# Patient Record
Sex: FEMALE | Race: WHITE | Employment: OTHER | URBAN - METROPOLITAN AREA
[De-identification: names, ages, dates, MRNs, and addresses within clinical notes are randomized per-mention and may not be internally consistent; named-entity substitution may affect disease eponyms.]

---

## 2017-01-03 RX ORDER — OMEPRAZOLE 20 MG/1
CAPSULE, DELAYED RELEASE ORAL
Qty: 90 CAPSULE | Refills: 1 | Status: SHIPPED | OUTPATIENT
Start: 2017-01-03 | End: 2017-07-04

## 2017-03-28 RX ORDER — AMLODIPINE BESYLATE AND BENAZEPRIL HYDROCHLORIDE 5; 10 MG/1; MG/1
CAPSULE ORAL
Qty: 90 CAPSULE | Refills: 0 | Status: SHIPPED | OUTPATIENT
Start: 2017-03-28 | End: 2017-06-20

## 2017-04-28 RX ORDER — SOLIFENACIN SUCCINATE 5 MG/1
TABLET, FILM COATED ORAL
Qty: 90 TABLET | Refills: 0 | Status: SHIPPED | OUTPATIENT
Start: 2017-04-28 | End: 2017-07-31

## 2017-06-20 RX ORDER — AMLODIPINE BESYLATE AND BENAZEPRIL HYDROCHLORIDE 5; 10 MG/1; MG/1
1 CAPSULE ORAL
Qty: 90 CAPSULE | Refills: 0 | Status: SHIPPED | OUTPATIENT
Start: 2017-06-20 | End: 2017-09-16

## 2017-07-06 RX ORDER — OMEPRAZOLE 20 MG/1
CAPSULE, DELAYED RELEASE ORAL
Qty: 90 CAPSULE | Refills: 0 | Status: SHIPPED | OUTPATIENT
Start: 2017-07-06 | End: 2017-10-12

## 2017-07-31 RX ORDER — SOLIFENACIN SUCCINATE 5 MG/1
TABLET, FILM COATED ORAL
Qty: 90 TABLET | Refills: 0 | Status: SHIPPED | OUTPATIENT
Start: 2017-07-31 | End: 2017-10-29

## 2017-09-18 RX ORDER — AMLODIPINE BESYLATE AND BENAZEPRIL HYDROCHLORIDE 5; 10 MG/1; MG/1
CAPSULE ORAL
Qty: 90 CAPSULE | Refills: 0 | Status: SHIPPED | OUTPATIENT
Start: 2017-09-18 | End: 2018-01-04

## 2017-10-13 RX ORDER — OMEPRAZOLE 20 MG/1
CAPSULE, DELAYED RELEASE ORAL
Qty: 90 CAPSULE | Refills: 0 | Status: SHIPPED | OUTPATIENT
Start: 2017-10-13 | End: 2018-01-04

## 2017-10-30 RX ORDER — SOLIFENACIN SUCCINATE 5 MG/1
TABLET, FILM COATED ORAL
Qty: 90 TABLET | Refills: 0 | Status: SHIPPED | OUTPATIENT
Start: 2017-10-30 | End: 2017-12-05

## 2017-11-02 ENCOUNTER — OFFICE VISIT (OUTPATIENT)
Dept: FAMILY MEDICINE CLINIC | Facility: CLINIC | Age: 72
End: 2017-11-02

## 2017-11-02 VITALS
OXYGEN SATURATION: 98 % | TEMPERATURE: 98 F | RESPIRATION RATE: 18 BRPM | BODY MASS INDEX: 23.74 KG/M2 | HEART RATE: 86 BPM | DIASTOLIC BLOOD PRESSURE: 80 MMHG | HEIGHT: 62 IN | SYSTOLIC BLOOD PRESSURE: 122 MMHG | WEIGHT: 129 LBS

## 2017-11-02 DIAGNOSIS — Z00.00 ROUTINE GENERAL MEDICAL EXAMINATION AT A HEALTH CARE FACILITY: Primary | ICD-10-CM

## 2017-11-02 DIAGNOSIS — M81.0 AGE RELATED OSTEOPOROSIS, UNSPECIFIED PATHOLOGICAL FRACTURE PRESENCE: ICD-10-CM

## 2017-11-02 DIAGNOSIS — Z12.31 ENCOUNTER FOR SCREENING MAMMOGRAM FOR MALIGNANT NEOPLASM OF BREAST: ICD-10-CM

## 2017-11-02 DIAGNOSIS — Z12.11 SCREENING FOR COLON CANCER: ICD-10-CM

## 2017-11-02 DIAGNOSIS — I10 ESSENTIAL HYPERTENSION: ICD-10-CM

## 2017-11-02 PROCEDURE — G0439 PPPS, SUBSEQ VISIT: HCPCS | Performed by: INTERNAL MEDICINE

## 2017-11-02 PROCEDURE — 99213 OFFICE O/P EST LOW 20 MIN: CPT | Performed by: INTERNAL MEDICINE

## 2017-11-02 NOTE — PROGRESS NOTES
Dmitri Barreto is a 74year old female who presents for a {dmg_medicare-_choices:5822}.     ***     Patient Care Team: Patient Care Team:  Vilma Larsen DO as PCP - [facility])    Patient Active Problem List:     Abnormal mammogram, unspeci Results  Component Value Date   TSH 1.530 04/07/2016   TSH 2.120 10/29/2013   TSH 2.79 08/22/2011       Lab Results  Component Value Date   BUN 17 04/07/2016   BUN 21 (H) 07/09/2015   BUN 17 10/29/2013   CREATSERUM 0.61 04/07/2016   CREATSERUM 0.54 07/09/2 all    PHQ-2 SCORE: 0        Advance Directives     Do you have a healthcare power of ?: No    Do you have a living will?: No     Please go to \"Cognitive Assessment\" under Medicare Assessment section in Charting, test patient and document. Alvaro Reyez performed in visit on 04/07/16  -PNEUMOCOCCAL VACC, 13 TRACIE IM    Update Immunization Activity if applicable    Hepatitis B No orders found for this or any previous visit.  Update Immunization Activity if applicable    Tetanus No orders found for this or any MOUTH EVERY DAY Disp: 90 capsule Rfl: 0   aspirin 81 MG Oral Tab Take 1 tablet (81 mg total) by mouth daily. Disp: 365 tablet Rfl: 0   Cholecalciferol (VITAMIN D) 1000 UNITS Oral Tab Take 1 tablet by mouth daily.  Disp: 365 tablet Rfl: 0   IBUPROFEN by Does asthma    EXAM:   /80   Pulse 86   Temp 98 °F (36.7 °C) (Oral)   Resp 18   Ht 62\"   Wt 129 lb   SpO2 98%   BMI 23.59 kg/m²      > BP Readings from Last 3 Encounters:  11/02/17 : 122/80  12/27/16 : 124/68  12/20/16 : 124/72      GENERAL: well develop breast    Screening for colon cancer  -     OCCULT BLOOD, FECAL, IMMUNOASSAY; Future       The patient indicates understanding of these issues and agrees to the plan.   The patient is asked to return in *** for ***.  {Counseling performed during this visit:

## 2017-11-02 NOTE — PROGRESS NOTES
Loreto Ferrera is a 74year old female who presents for a Medicare Annual Wellness visit.  Pt has been made aware of what is covered/included in the AWV, and any additional concerns to be addressed ensue an additional visit cost.       Patient Care Team: Evelyn Templeton Results  Component Value Date    04/07/2016    10/29/2013    08/22/2011       Lab Results  Component Value Date   AST 16 04/07/2016   AST 19 07/09/2015   AST 13 (L) 10/29/2013       Lab Results  Component Value Date   ALT 22 04/07/2016 Fall/Risk Scorin          Depression Screening (PHQ-2/PHQ-9): Over the LAST 2 WEEKS   Little interest or pleasure in doing things (over the last two weeks)?: Not at all    Feeling down, depressed, or hopeless (over the las Density Screening      Dexascan Every two years Last Dexa Scan:   XR DEXA BONE DENSITOMETRY (CPT=77080) 05/03/2016    No flowsheet data found.     Pap and Pelvic      Pap: Every 3 yrs age 21-65 or Pap+HPV every 5 yrs age 33-67, age 72 and older at high risk (calc))   Date Value   08/22/2011 127     LDL CHOLESTROL (mg/dL)   Date Value   10/29/2013 118     LDL Cholesterol (mg/dL)   Date Value   04/07/2016 128    No flowsheet data found. Dilated Eye exam  Annually No flowsheet data found.   No flowsheet data Used                      Alcohol use: Yes           5.0 oz/week     Glasses of wine: 10 per week     Comment: social    Occ: homemaker  : yes        REVIEW OF SYSTEMS:   GENERAL: feels well otherwise  EYES: denies blurred vision or double vision; m Low Na+ diet  -     CBC WITH DIFFERENTIAL WITH PLATELET; Future  -     COMP METABOLIC PANEL (14); Future  -     LIPID PANEL;  Future  -     TSH W REFLEX TO FREE T4; Future      Recommended updating her vision exam with Dr. Ramya Smith, no driving at night or bad

## 2017-11-02 NOTE — PATIENT INSTRUCTIONS
LAB HOURS & LOCATIONS        Brittany  ([facility])    [address]   [address] [place] [address]     (Building A)         [address]    Mon-Fri  5am-8pm     Mon-Fri   7am-4pm  Sat         6am-3pm     Sat          7am-3pm      Latoya Shukla

## 2017-12-05 PROCEDURE — 87077 CULTURE AEROBIC IDENTIFY: CPT | Performed by: PHYSICIAN ASSISTANT

## 2017-12-05 PROCEDURE — 87086 URINE CULTURE/COLONY COUNT: CPT | Performed by: PHYSICIAN ASSISTANT

## 2017-12-05 PROCEDURE — 87186 SC STD MICRODIL/AGAR DIL: CPT | Performed by: PHYSICIAN ASSISTANT

## 2018-01-04 ENCOUNTER — TELEPHONE (OUTPATIENT)
Dept: FAMILY MEDICINE CLINIC | Facility: CLINIC | Age: 73
End: 2018-01-04

## 2018-01-04 PROCEDURE — 87086 URINE CULTURE/COLONY COUNT: CPT | Performed by: PHYSICIAN ASSISTANT

## 2018-01-04 PROCEDURE — 81001 URINALYSIS AUTO W/SCOPE: CPT | Performed by: PHYSICIAN ASSISTANT

## 2018-01-04 RX ORDER — OMEPRAZOLE 20 MG/1
CAPSULE, DELAYED RELEASE ORAL
Qty: 90 CAPSULE | Refills: 0 | Status: SHIPPED | OUTPATIENT
Start: 2018-01-04 | End: 2018-04-06

## 2018-01-04 RX ORDER — AMLODIPINE BESYLATE AND BENAZEPRIL HYDROCHLORIDE 5; 10 MG/1; MG/1
1 CAPSULE ORAL
Qty: 90 CAPSULE | Refills: 0 | Status: SHIPPED | OUTPATIENT
Start: 2018-01-04 | End: 2018-04-06

## 2018-01-04 NOTE — TELEPHONE ENCOUNTER
All meds need new scripts to new pharmacy - [facility] [address] [facility] and [facility] in [place].   No med info offered as pt was in car, said to use list in system

## 2018-03-21 PROCEDURE — 87147 CULTURE TYPE IMMUNOLOGIC: CPT | Performed by: PHYSICIAN ASSISTANT

## 2018-03-21 PROCEDURE — 87086 URINE CULTURE/COLONY COUNT: CPT | Performed by: PHYSICIAN ASSISTANT

## 2018-04-07 NOTE — TELEPHONE ENCOUNTER
Let pt or her  know that she is overdue for labs. Last set of labs was in 2016. Give #30 of her requested meds for now.

## 2018-04-09 RX ORDER — OMEPRAZOLE 20 MG/1
CAPSULE, DELAYED RELEASE ORAL
Qty: 30 CAPSULE | Refills: 0 | Status: SHIPPED | OUTPATIENT
Start: 2018-04-09 | End: 2018-05-10

## 2018-04-09 RX ORDER — AMLODIPINE BESYLATE AND BENAZEPRIL HYDROCHLORIDE 5; 10 MG/1; MG/1
CAPSULE ORAL
Qty: 30 CAPSULE | Refills: 0 | Status: SHIPPED | OUTPATIENT
Start: 2018-04-09 | End: 2018-05-10

## 2018-05-10 ENCOUNTER — OFFICE VISIT (OUTPATIENT)
Dept: FAMILY MEDICINE CLINIC | Facility: CLINIC | Age: 73
End: 2018-05-10

## 2018-05-10 VITALS
DIASTOLIC BLOOD PRESSURE: 72 MMHG | SYSTOLIC BLOOD PRESSURE: 122 MMHG | TEMPERATURE: 99 F | HEART RATE: 81 BPM | WEIGHT: 127 LBS | HEIGHT: 64 IN | BODY MASS INDEX: 21.68 KG/M2 | OXYGEN SATURATION: 99 % | RESPIRATION RATE: 29 BRPM

## 2018-05-10 DIAGNOSIS — K21.9 GASTROESOPHAGEAL REFLUX DISEASE WITHOUT ESOPHAGITIS: ICD-10-CM

## 2018-05-10 DIAGNOSIS — I10 ESSENTIAL HYPERTENSION: Primary | ICD-10-CM

## 2018-05-10 DIAGNOSIS — M81.0 AGE-RELATED OSTEOPOROSIS WITHOUT CURRENT PATHOLOGICAL FRACTURE: ICD-10-CM

## 2018-05-10 PROCEDURE — 99214 OFFICE O/P EST MOD 30 MIN: CPT | Performed by: INTERNAL MEDICINE

## 2018-05-10 RX ORDER — OMEPRAZOLE 20 MG/1
20 CAPSULE, DELAYED RELEASE ORAL
Qty: 90 CAPSULE | Refills: 0 | Status: SHIPPED | OUTPATIENT
Start: 2018-05-10 | End: 2018-09-22

## 2018-05-10 RX ORDER — AMLODIPINE BESYLATE AND BENAZEPRIL HYDROCHLORIDE 5; 10 MG/1; MG/1
1 CAPSULE ORAL
Qty: 90 CAPSULE | Refills: 0 | Status: SHIPPED | OUTPATIENT
Start: 2018-05-10 | End: 2018-11-06

## 2018-05-10 RX ORDER — TROSPIUM CHLORIDE 20 MG/1
TABLET, FILM COATED ORAL
COMMUNITY
Start: 2018-05-04 | End: 2018-12-20

## 2018-05-10 NOTE — PATIENT INSTRUCTIONS
LAB HOURS & LOCATIONS        Brittany  [facility])    [address]   [address] [place] [address]     (Building A)         [address] [place] [address] Ave    Mon-Fri  5am-8pm     Mon-Fri   7am-4pm  Sat         6am-3pm     Sat          7am-3pm      Latoya Shukla

## 2018-05-13 NOTE — PROGRESS NOTES
Nishant Mercer is a 74year old female. HPI:   Here for med follow up. Taking amlodipine/benazepril with good results. No SEs. Does not check readings at home. Does not add Na+ to food. Not exercising. Also with history of osteoporosis. Due for DEXA.  Jax Noguera 127 lb   SpO2 99%   BMI 21.80 kg/m²   GENERAL: well developed, well nourished,in no apparent distress  SKIN: warm & dry  HEENT: atraumatic, normocephalic, TMs clear, throat clear  NECK: supple,no adenopathy   LUNGS: CTA, easy breathing  CV: normal S1S2, RR

## 2018-05-15 ENCOUNTER — LABORATORY ENCOUNTER (OUTPATIENT)
Dept: LAB | Age: 73
End: 2018-05-15
Attending: INTERNAL MEDICINE
Payer: MEDICARE

## 2018-05-15 DIAGNOSIS — I10 ESSENTIAL HYPERTENSION: ICD-10-CM

## 2018-05-15 PROCEDURE — 85025 COMPLETE CBC W/AUTO DIFF WBC: CPT

## 2018-05-15 PROCEDURE — 36415 COLL VENOUS BLD VENIPUNCTURE: CPT

## 2018-05-15 PROCEDURE — 80061 LIPID PANEL: CPT

## 2018-05-15 PROCEDURE — 84443 ASSAY THYROID STIM HORMONE: CPT

## 2018-05-15 PROCEDURE — 80053 COMPREHEN METABOLIC PANEL: CPT

## 2018-05-30 RX ORDER — AMLODIPINE BESYLATE AND BENAZEPRIL HYDROCHLORIDE 5; 10 MG/1; MG/1
CAPSULE ORAL
Qty: 30 CAPSULE | Refills: 0 | Status: SHIPPED | OUTPATIENT
Start: 2018-05-30 | End: 2018-10-08

## 2018-05-30 RX ORDER — OMEPRAZOLE 20 MG/1
CAPSULE, DELAYED RELEASE ORAL
Qty: 30 CAPSULE | Refills: 0 | Status: SHIPPED | OUTPATIENT
Start: 2018-05-30 | End: 2018-12-03

## 2018-06-20 ENCOUNTER — HOSPITAL ENCOUNTER (OUTPATIENT)
Dept: BONE DENSITY | Age: 73
Discharge: HOME | End: 2018-06-20
Attending: INTERNAL MEDICINE
Payer: MEDICARE

## 2018-06-20 DIAGNOSIS — M81.0 AGE-RELATED OSTEOPOROSIS WITHOUT CURRENT PATHOLOGICAL FRACTURE: ICD-10-CM

## 2018-06-20 PROCEDURE — 77080 DXA BONE DENSITY AXIAL: CPT | Performed by: INTERNAL MEDICINE

## 2018-07-11 PROCEDURE — 87480 CANDIDA DNA DIR PROBE: CPT | Performed by: PHYSICIAN ASSISTANT

## 2018-07-11 PROCEDURE — 87660 TRICHOMONAS VAGIN DIR PROBE: CPT | Performed by: PHYSICIAN ASSISTANT

## 2018-07-11 PROCEDURE — 87510 GARDNER VAG DNA DIR PROBE: CPT | Performed by: PHYSICIAN ASSISTANT

## 2018-09-24 RX ORDER — OMEPRAZOLE 20 MG/1
CAPSULE, DELAYED RELEASE ORAL
Qty: 90 CAPSULE | Refills: 0 | Status: SHIPPED | OUTPATIENT
Start: 2018-09-24 | End: 2018-11-06 | Stop reason: ALTCHOICE

## 2018-10-08 RX ORDER — AMLODIPINE BESYLATE AND BENAZEPRIL HYDROCHLORIDE 5; 10 MG/1; MG/1
CAPSULE ORAL
Qty: 30 CAPSULE | Refills: 0 | Status: SHIPPED | OUTPATIENT
Start: 2018-10-08 | End: 2018-11-06 | Stop reason: ALTCHOICE

## 2018-11-07 RX ORDER — AMLODIPINE BESYLATE AND BENAZEPRIL HYDROCHLORIDE 5; 10 MG/1; MG/1
CAPSULE ORAL
Qty: 30 CAPSULE | Refills: 0 | Status: SHIPPED | OUTPATIENT
Start: 2018-11-07 | End: 2018-12-03

## 2018-11-07 RX ORDER — AMLODIPINE BESYLATE AND BENAZEPRIL HYDROCHLORIDE 5; 10 MG/1; MG/1
CAPSULE ORAL
Qty: 30 CAPSULE | Refills: 0 | OUTPATIENT
Start: 2018-11-07

## 2018-11-13 ENCOUNTER — OFFICE VISIT (OUTPATIENT)
Dept: FAMILY MEDICINE CLINIC | Facility: CLINIC | Age: 73
End: 2018-11-13
Payer: MEDICARE

## 2018-11-13 VITALS
TEMPERATURE: 97 F | WEIGHT: 123 LBS | DIASTOLIC BLOOD PRESSURE: 82 MMHG | HEIGHT: 62.5 IN | HEART RATE: 76 BPM | SYSTOLIC BLOOD PRESSURE: 126 MMHG | BODY MASS INDEX: 22.07 KG/M2 | OXYGEN SATURATION: 98 % | RESPIRATION RATE: 16 BRPM

## 2018-11-13 DIAGNOSIS — I10 ESSENTIAL HYPERTENSION: ICD-10-CM

## 2018-11-13 DIAGNOSIS — M81.0 AGE-RELATED OSTEOPOROSIS WITHOUT CURRENT PATHOLOGICAL FRACTURE: ICD-10-CM

## 2018-11-13 DIAGNOSIS — Z00.00 MEDICARE ANNUAL WELLNESS VISIT, SUBSEQUENT: Primary | ICD-10-CM

## 2018-11-13 PROCEDURE — G0008 ADMIN INFLUENZA VIRUS VAC: HCPCS | Performed by: FAMILY MEDICINE

## 2018-11-13 PROCEDURE — G0439 PPPS, SUBSEQ VISIT: HCPCS | Performed by: FAMILY MEDICINE

## 2018-11-13 PROCEDURE — 90653 IIV ADJUVANT VACCINE IM: CPT | Performed by: FAMILY MEDICINE

## 2018-11-13 NOTE — PROGRESS NOTES
Barb Grimm REASON FOR VISIT:    Jomar Tarango is a 74year old female who presents for a Medicare Annual Wellness visit.          Patient Care Team: Patient Care Team:  Yanira Banks DO as PCP - [facility])    Patient Active Problem List:     [facility]      In the past six months, have you lost more than 10 pounds without trying?: 2 - No  Has your appetite been poor?: No  Type of Diet: Balanced  How does the patient maintain a good energy level?: Stretching;Daily Walks  How would you describe your Do you have a healthcare power of ?: Yes  Do you have a living will?: Yes     Cognitive Assessment     What day of the week is this?: Correct  What month is it?: Correct  What year is it?: Correct  Recall \"Ball\": Correct  Recall \"Flag\": Quinton Pinon benign cysts.    • HYSTERECTOMY  mid 2000s   • OOPHORECTOMY Bilateral mid 2000's   • OTHER      dermal autograft arms   • TONSILLECTOMY        Family History   Problem Relation Age of Onset   • Heart Disorder Mother    • Other (CHF) Mother    • Other (MVC) The patient indicates understanding of these issues and agrees to the plan.   The patient is asked to return in 4 months for office visit  Diet counseling perfomed    SUGGESTED VACCINATIONS - Influenza, Pneumococcal, Zoster, Tetanus   Influenza: Influenza

## 2018-11-13 NOTE — PROGRESS NOTES
Patient is here for an annual wellness visit for Medicare. The conversation quickly turned to addressing her high risk of falls and her joint and soft tissue aches and pains. Lumbar recent DEXA bone scan confirmed osteoporosis body wide.   She has had fra

## 2018-11-16 PROBLEM — M81.0 SENILE OSTEOPOROSIS: Status: ACTIVE | Noted: 2018-11-16

## 2018-12-03 RX ORDER — OMEPRAZOLE 20 MG/1
CAPSULE, DELAYED RELEASE ORAL
Qty: 90 CAPSULE | Refills: 0 | Status: SHIPPED | OUTPATIENT
Start: 2018-12-03 | End: 2019-05-02

## 2018-12-03 RX ORDER — AMLODIPINE BESYLATE AND BENAZEPRIL HYDROCHLORIDE 5; 10 MG/1; MG/1
CAPSULE ORAL
Qty: 30 CAPSULE | Refills: 0 | Status: SHIPPED | OUTPATIENT
Start: 2018-12-03 | End: 2019-01-01

## 2018-12-03 NOTE — TELEPHONE ENCOUNTER
Rx Request  OMEPRAZOLE 20 MG Oral Capsule Delayed Release  Disp:     90               R: 0  Last Refilled: 11/07/2018  AMLODIPINE BESY-BENAZEPRIL HCL 5-10 MG Oral Cap  Disp:     30               R: 0  Last Refilled: 05/30/2018      Last Visit: 11/13/2018 (

## 2018-12-04 ENCOUNTER — APPOINTMENT (OUTPATIENT)
Dept: HEMATOLOGY/ONCOLOGY | Facility: HOSPITAL | Age: 73
End: 2018-12-04
Attending: FAMILY MEDICINE
Payer: MEDICARE

## 2018-12-28 ENCOUNTER — OFFICE VISIT (OUTPATIENT)
Dept: SPORTS MEDICINE | Age: 73
End: 2018-12-28
Attending: FAMILY MEDICINE

## 2018-12-28 ENCOUNTER — WALK IN (OUTPATIENT)
Dept: URGENT CARE | Age: 73
End: 2018-12-28

## 2018-12-28 ENCOUNTER — IMAGING SERVICES (OUTPATIENT)
Dept: GENERAL RADIOLOGY | Age: 73
End: 2018-12-28
Attending: FAMILY MEDICINE

## 2018-12-28 VITALS
HEART RATE: 76 BPM | BODY MASS INDEX: 21.97 KG/M2 | WEIGHT: 124 LBS | RESPIRATION RATE: 16 BRPM | SYSTOLIC BLOOD PRESSURE: 108 MMHG | DIASTOLIC BLOOD PRESSURE: 72 MMHG | HEIGHT: 63 IN

## 2018-12-28 DIAGNOSIS — S62.514A CLOSED NONDISPLACED FRACTURE OF PROXIMAL PHALANX OF RIGHT THUMB, INITIAL ENCOUNTER: Primary | ICD-10-CM

## 2018-12-28 DIAGNOSIS — S00.83XA CONTUSION OF FACE, INITIAL ENCOUNTER: ICD-10-CM

## 2018-12-28 DIAGNOSIS — S62.501A CLOSED NONDISPLACED FRACTURE OF PHALANX OF RIGHT THUMB, UNSPECIFIED PHALANX, INITIAL ENCOUNTER: Primary | ICD-10-CM

## 2018-12-28 DIAGNOSIS — S60.011A CONTUSION OF RIGHT THUMB WITHOUT DAMAGE TO NAIL, INITIAL ENCOUNTER: ICD-10-CM

## 2018-12-28 PROCEDURE — 99204 OFFICE O/P NEW MOD 45 MIN: CPT | Performed by: FAMILY MEDICINE

## 2018-12-28 PROCEDURE — 73140 X-RAY EXAM OF FINGER(S): CPT | Performed by: RADIOLOGY

## 2018-12-28 PROCEDURE — 99204 OFFICE O/P NEW MOD 45 MIN: CPT | Performed by: PEDIATRICS

## 2018-12-28 RX ORDER — OMEPRAZOLE 20 MG/1
20 CAPSULE, DELAYED RELEASE ORAL DAILY
COMMUNITY

## 2018-12-28 ASSESSMENT — PAIN SCALES - GENERAL: PAINLEVEL: 0

## 2019-01-02 RX ORDER — AMLODIPINE BESYLATE AND BENAZEPRIL HYDROCHLORIDE 5; 10 MG/1; MG/1
CAPSULE ORAL
Qty: 30 CAPSULE | Refills: 2 | Status: SHIPPED | OUTPATIENT
Start: 2019-01-02 | End: 2019-04-01

## 2019-01-08 ENCOUNTER — OFFICE VISIT (OUTPATIENT)
Dept: HEMATOLOGY/ONCOLOGY | Age: 74
End: 2019-01-08
Attending: FAMILY MEDICINE
Payer: MEDICARE

## 2019-01-08 VITALS
RESPIRATION RATE: 18 BRPM | OXYGEN SATURATION: 95 % | SYSTOLIC BLOOD PRESSURE: 151 MMHG | TEMPERATURE: 97 F | DIASTOLIC BLOOD PRESSURE: 76 MMHG | HEART RATE: 73 BPM

## 2019-01-08 DIAGNOSIS — M81.0 SENILE OSTEOPOROSIS: Primary | ICD-10-CM

## 2019-01-08 LAB
ALBUMIN SERPL-MCNC: 3.7 G/DL (ref 3.1–4.5)
CALCIUM BLD-MCNC: 9.4 MG/DL (ref 8.3–10.3)
CREAT BLD-MCNC: 0.58 MG/DL (ref 0.55–1.02)
HAV IGM SER QL: 2 MG/DL (ref 1.8–2.5)
PHOSPHATE SERPL-MCNC: 3.2 MG/DL (ref 2.5–4.9)

## 2019-01-08 PROCEDURE — 82310 ASSAY OF CALCIUM: CPT

## 2019-01-08 PROCEDURE — 82565 ASSAY OF CREATININE: CPT

## 2019-01-08 PROCEDURE — 36415 COLL VENOUS BLD VENIPUNCTURE: CPT

## 2019-01-08 PROCEDURE — 82040 ASSAY OF SERUM ALBUMIN: CPT

## 2019-01-08 PROCEDURE — 96372 THER/PROPH/DIAG INJ SC/IM: CPT

## 2019-01-08 PROCEDURE — 84100 ASSAY OF PHOSPHORUS: CPT

## 2019-01-08 PROCEDURE — 83735 ASSAY OF MAGNESIUM: CPT

## 2019-01-08 NOTE — PATIENT INSTRUCTIONS
Denosumab injection  Brand Names: Prolia  What is this medicine? DENOSUMAB (den oh chas mab) slows bone breakdown. Prolia is used to treat osteoporosis in women after menopause and in men. How should I use this medicine?   This medicine is for injection infections  · kidney disease or on dialysis  · low blood calcium or vitamin D  · malabsorption syndrome  · scheduled to have surgery or tooth extraction  · taking medicine that contains denosumab  · thyroid or parathyroid disease  · an unusual reaction to

## 2019-01-16 ENCOUNTER — IMAGING SERVICES (OUTPATIENT)
Dept: GENERAL RADIOLOGY | Age: 74
End: 2019-01-16
Attending: PEDIATRICS

## 2019-01-16 ENCOUNTER — OFFICE VISIT (OUTPATIENT)
Dept: SPORTS MEDICINE | Age: 74
End: 2019-01-16

## 2019-01-16 DIAGNOSIS — S62.514D CLOSED NONDISPLACED FRACTURE OF PROXIMAL PHALANX OF RIGHT THUMB WITH ROUTINE HEALING, SUBSEQUENT ENCOUNTER: ICD-10-CM

## 2019-01-16 DIAGNOSIS — S62.514D CLOSED NONDISPLACED FRACTURE OF PROXIMAL PHALANX OF RIGHT THUMB WITH ROUTINE HEALING, SUBSEQUENT ENCOUNTER: Primary | ICD-10-CM

## 2019-01-16 PROCEDURE — 99213 OFFICE O/P EST LOW 20 MIN: CPT | Performed by: PEDIATRICS

## 2019-01-16 PROCEDURE — 73140 X-RAY EXAM OF FINGER(S): CPT | Performed by: RADIOLOGY

## 2019-04-01 RX ORDER — AMLODIPINE BESYLATE AND BENAZEPRIL HYDROCHLORIDE 5; 10 MG/1; MG/1
CAPSULE ORAL
Qty: 30 CAPSULE | Refills: 1 | Status: SHIPPED | OUTPATIENT
Start: 2019-04-01 | End: 2019-05-28

## 2019-05-02 RX ORDER — OMEPRAZOLE 20 MG/1
CAPSULE, DELAYED RELEASE ORAL
Qty: 90 CAPSULE | Refills: 0 | Status: SHIPPED | OUTPATIENT
Start: 2019-05-02 | End: 2019-07-29

## 2019-05-28 RX ORDER — AMLODIPINE BESYLATE AND BENAZEPRIL HYDROCHLORIDE 5; 10 MG/1; MG/1
CAPSULE ORAL
Qty: 30 CAPSULE | Refills: 0 | Status: SHIPPED | OUTPATIENT
Start: 2019-05-28 | End: 2019-07-07

## 2019-07-08 RX ORDER — AMLODIPINE BESYLATE AND BENAZEPRIL HYDROCHLORIDE 5; 10 MG/1; MG/1
CAPSULE ORAL
Qty: 30 CAPSULE | Refills: 0 | Status: SHIPPED | OUTPATIENT
Start: 2019-07-08 | End: 2019-07-29

## 2019-07-14 ENCOUNTER — TELEPHONE (OUTPATIENT)
Dept: FAMILY MEDICINE CLINIC | Facility: CLINIC | Age: 74
End: 2019-07-14

## 2019-07-29 ENCOUNTER — OFFICE VISIT (OUTPATIENT)
Dept: FAMILY MEDICINE CLINIC | Facility: CLINIC | Age: 74
End: 2019-07-29
Payer: MEDICARE

## 2019-07-29 VITALS
SYSTOLIC BLOOD PRESSURE: 122 MMHG | HEART RATE: 76 BPM | OXYGEN SATURATION: 98 % | HEIGHT: 62.5 IN | WEIGHT: 131 LBS | DIASTOLIC BLOOD PRESSURE: 82 MMHG | BODY MASS INDEX: 23.5 KG/M2 | TEMPERATURE: 98 F | RESPIRATION RATE: 16 BRPM

## 2019-07-29 DIAGNOSIS — N32.81 OVERACTIVE BLADDER DUE TO PROLAPSE OF FEMALE GENITAL ORGAN: ICD-10-CM

## 2019-07-29 DIAGNOSIS — M81.0 AGE-RELATED OSTEOPOROSIS WITHOUT CURRENT PATHOLOGICAL FRACTURE: ICD-10-CM

## 2019-07-29 DIAGNOSIS — N81.9 OVERACTIVE BLADDER DUE TO PROLAPSE OF FEMALE GENITAL ORGAN: ICD-10-CM

## 2019-07-29 DIAGNOSIS — R05.9 COUGH: ICD-10-CM

## 2019-07-29 DIAGNOSIS — K21.9 GASTROESOPHAGEAL REFLUX DISEASE WITHOUT ESOPHAGITIS: ICD-10-CM

## 2019-07-29 DIAGNOSIS — I10 ESSENTIAL HYPERTENSION: Primary | ICD-10-CM

## 2019-07-29 PROCEDURE — 99214 OFFICE O/P EST MOD 30 MIN: CPT | Performed by: PHYSICIAN ASSISTANT

## 2019-07-29 RX ORDER — OMEPRAZOLE 20 MG/1
CAPSULE, DELAYED RELEASE ORAL
Qty: 90 CAPSULE | Refills: 0 | Status: SHIPPED | OUTPATIENT
Start: 2019-07-29 | End: 2019-07-29

## 2019-07-29 RX ORDER — TROSPIUM CHLORIDE 20 MG/1
TABLET, FILM COATED ORAL
Qty: 90 TABLET | Refills: 1 | Status: SHIPPED | OUTPATIENT
Start: 2019-07-29 | End: 2020-03-09

## 2019-07-29 RX ORDER — OMEPRAZOLE 20 MG/1
20 CAPSULE, DELAYED RELEASE ORAL
Qty: 90 CAPSULE | Refills: 1 | Status: SHIPPED | OUTPATIENT
Start: 2019-07-29 | End: 2020-02-04

## 2019-07-29 RX ORDER — BENZONATATE 100 MG/1
100 CAPSULE ORAL 3 TIMES DAILY PRN
Qty: 30 CAPSULE | Refills: 0 | Status: SHIPPED | OUTPATIENT
Start: 2019-07-29 | End: 2020-02-04

## 2019-07-29 RX ORDER — AMLODIPINE BESYLATE AND BENAZEPRIL HYDROCHLORIDE 5; 10 MG/1; MG/1
1 CAPSULE ORAL
Qty: 90 CAPSULE | Refills: 1 | Status: SHIPPED | OUTPATIENT
Start: 2019-07-29 | End: 2020-01-20

## 2019-07-29 NOTE — PROGRESS NOTES
HPI:    Patient ID: Carly Ascencio is a 66year old female. HPI   Patient with history of hypertension, GERD, osteoporosis, pelvic organ prolapse presents for follow-up and medication refill.   She was last seen in the office in November by PCP for Medica 20 MG Oral Tab TAKE ONE TABLET BY MOUTH TWO TIMES DAILY (patient only taking once daily) Disp: 90 tablet Rfl: 1   benzonatate 100 MG Oral Cap Take 1 capsule (100 mg total) by mouth 3 (three) times daily as needed for cough.  Disp: 30 capsule Rfl: 0   aspiri by mouth once daily. Dispense: 90 capsule; Refill: 1    3. Age-related osteoporosis without current pathological fracture  Due for second Prolia injection, advised to follow-up in the cancer center for this.   Continue calcium and vitamin D supplements as

## 2019-08-21 DIAGNOSIS — K21.9 GASTROESOPHAGEAL REFLUX DISEASE WITHOUT ESOPHAGITIS: ICD-10-CM

## 2020-01-19 DIAGNOSIS — I10 ESSENTIAL HYPERTENSION: ICD-10-CM

## 2020-01-20 RX ORDER — AMLODIPINE BESYLATE AND BENAZEPRIL HYDROCHLORIDE 5; 10 MG/1; MG/1
CAPSULE ORAL
Qty: 90 CAPSULE | Refills: 0 | Status: SHIPPED | OUTPATIENT
Start: 2020-01-20 | End: 2020-02-04

## 2020-01-20 NOTE — TELEPHONE ENCOUNTER
Spoke to patient and informed her that her prescription has been sent to pharmacy and that she is due for her AWV. She stated she would call back to schedule.

## 2020-01-20 NOTE — TELEPHONE ENCOUNTER
Rx Request  amLODIPine Besy-Benazepril HCl 5-10 MG Oral Cap    Disp:      90              R: 0      Associated Dx:Essential hypertension    Last Visit: 07/29/2019    Last Refilled: 07/29/2019    Protocol Passed?  Yes[  ]       No[ x ]

## 2020-02-04 ENCOUNTER — OFFICE VISIT (OUTPATIENT)
Dept: FAMILY MEDICINE CLINIC | Facility: CLINIC | Age: 75
End: 2020-02-04
Payer: MEDICARE

## 2020-02-04 VITALS
TEMPERATURE: 98 F | BODY MASS INDEX: 23.68 KG/M2 | RESPIRATION RATE: 20 BRPM | HEIGHT: 62.5 IN | HEART RATE: 82 BPM | WEIGHT: 132 LBS | OXYGEN SATURATION: 97 % | DIASTOLIC BLOOD PRESSURE: 80 MMHG | SYSTOLIC BLOOD PRESSURE: 132 MMHG

## 2020-02-04 DIAGNOSIS — Z00.00 ROUTINE GENERAL MEDICAL EXAMINATION AT A HEALTH CARE FACILITY: Primary | ICD-10-CM

## 2020-02-04 DIAGNOSIS — K21.9 GASTROESOPHAGEAL REFLUX DISEASE WITHOUT ESOPHAGITIS: ICD-10-CM

## 2020-02-04 DIAGNOSIS — N64.4 BREAST PAIN, LEFT: ICD-10-CM

## 2020-02-04 DIAGNOSIS — N60.12 FIBROCYSTIC DISEASE OF BOTH BREASTS: ICD-10-CM

## 2020-02-04 DIAGNOSIS — M81.0 AGE-RELATED OSTEOPOROSIS WITHOUT CURRENT PATHOLOGICAL FRACTURE: ICD-10-CM

## 2020-02-04 DIAGNOSIS — N60.11 FIBROCYSTIC DISEASE OF BOTH BREASTS: ICD-10-CM

## 2020-02-04 DIAGNOSIS — Z12.31 ENCOUNTER FOR SCREENING MAMMOGRAM FOR BREAST CANCER: ICD-10-CM

## 2020-02-04 DIAGNOSIS — I10 ESSENTIAL HYPERTENSION: ICD-10-CM

## 2020-02-04 PROCEDURE — G0439 PPPS, SUBSEQ VISIT: HCPCS | Performed by: INTERNAL MEDICINE

## 2020-02-04 PROCEDURE — 99213 OFFICE O/P EST LOW 20 MIN: CPT | Performed by: INTERNAL MEDICINE

## 2020-02-04 RX ORDER — OMEPRAZOLE 20 MG/1
20 CAPSULE, DELAYED RELEASE ORAL
Qty: 90 CAPSULE | Refills: 1 | Status: SHIPPED | OUTPATIENT
Start: 2020-02-04 | End: 2020-10-12

## 2020-02-04 RX ORDER — AMLODIPINE BESYLATE AND BENAZEPRIL HYDROCHLORIDE 5; 10 MG/1; MG/1
1 CAPSULE ORAL
Qty: 90 CAPSULE | Refills: 1 | Status: SHIPPED | OUTPATIENT
Start: 2020-02-04 | End: 2020-11-05

## 2020-02-04 NOTE — PROGRESS NOTES
REASON FOR VISIT:    Deborah Lambert is a 66year old female who presents for a Medicare Annual Wellness visit.   Pt has been made aware of what is covered/included in the AWV, and any additional concerns to be addressed ensue an additional visit cost.    New Cholesterol <200 mg/dL 185 199 203(H) 226(H)   Triglycerides <150 mg/dL 160(H) 140 232(H) 279(H)   HDL >45 mg/dL 36(L) 43(L) 39(L) 43(L)   LDL <130 mg/dL 117 128 118 127     BUN and Cr Latest Ref Rng & Units 1/8/2019 5/15/2018 4/7/2016 7/9/2015 10/29/2013 Fall/Risk Assessment     Have you fallen in the last 12 months?: 0-No  Do you have 3 or more medical conditions?: 0-No  Do you accidently lose urine?: 1-Yes  Do you have difficulty seeing?: 1-Yes  Do you have any difficulty walking or getting up?: 0-No OCCULTSTOOL   Glaucoma Screening     Ophthalmology Visit Annually 2019   Immunizations     Zoster (Not covered by Medicare Part B) No orders found for this or any previous visit.      SPECIFIC DISEASE MONITORING Internal Lab or Procedure   No disease specif 8.3 standard drinks      Types: 10 Glasses of wine per week      Comment: social    Drug use: No       REVIEW OF SYSTEMS:   GENERAL: feels well otherwise  SKIN: denies any unusual skin lesions  EYES: denies blurred vision or double vision  HEENT: denies na CBC WITH DIFFERENTIAL WITH PLATELET; Future  -     COMP METABOLIC PANEL (14); Future  -     LIPID PANEL; Future  -     ASSAY, THYROID STIM HORMONE; Future  -     XR DEXA BONE DENSITOMETRY (CPT=77080);  Future  -     FABIANO GOPAL 2D+3D DIAGNOSTIC [facility]  BILAT (

## 2020-02-04 NOTE — PATIENT INSTRUCTIONS
LAB HOURS & LOCATIONS        Britt[facility]  [facility])    [address] [place] [address]     (Building A)         [address] [place] [address] Ave    Mon-Fri  5am-8pm     Mon-Fri   7am-4pm  Sat         6am-3pm     Sat          7am-3pm      Latoya Shukla

## 2020-02-06 ENCOUNTER — TELEPHONE (OUTPATIENT)
Dept: FAMILY MEDICINE CLINIC | Facility: CLINIC | Age: 75
End: 2020-02-06

## 2020-02-26 ENCOUNTER — LAB ENCOUNTER (OUTPATIENT)
Dept: LAB | Age: 75
End: 2020-02-26
Attending: INTERNAL MEDICINE
Payer: MEDICARE

## 2020-02-26 DIAGNOSIS — Z00.00 ROUTINE GENERAL MEDICAL EXAMINATION AT A HEALTH CARE FACILITY: ICD-10-CM

## 2020-02-26 LAB
ALBUMIN SERPL-MCNC: 3.7 G/DL (ref 3.4–5)
ALBUMIN/GLOB SERPL: 1 {RATIO} (ref 1–2)
ALP LIVER SERPL-CCNC: 115 U/L (ref 55–142)
ALT SERPL-CCNC: 22 U/L (ref 13–56)
ANION GAP SERPL CALC-SCNC: 3 MMOL/L (ref 0–18)
AST SERPL-CCNC: 16 U/L (ref 15–37)
BASOPHILS # BLD AUTO: 0.06 X10(3) UL (ref 0–0.2)
BASOPHILS NFR BLD AUTO: 1.2 %
BILIRUB SERPL-MCNC: 0.3 MG/DL (ref 0.1–2)
BUN BLD-MCNC: 15 MG/DL (ref 7–18)
BUN/CREAT SERPL: 23.4 (ref 10–20)
CALCIUM BLD-MCNC: 9.7 MG/DL (ref 8.5–10.1)
CHLORIDE SERPL-SCNC: 107 MMOL/L (ref 98–112)
CHOLEST SMN-MCNC: 208 MG/DL (ref ?–200)
CO2 SERPL-SCNC: 30 MMOL/L (ref 21–32)
CREAT BLD-MCNC: 0.64 MG/DL (ref 0.55–1.02)
DEPRECATED RDW RBC AUTO: 46.6 FL (ref 35.1–46.3)
EOSINOPHIL # BLD AUTO: 0.41 X10(3) UL (ref 0–0.7)
EOSINOPHIL NFR BLD AUTO: 8.4 %
ERYTHROCYTE [DISTWIDTH] IN BLOOD BY AUTOMATED COUNT: 13.3 % (ref 11–15)
GLOBULIN PLAS-MCNC: 3.6 G/DL (ref 2.8–4.4)
GLUCOSE BLD-MCNC: 94 MG/DL (ref 70–99)
HCT VFR BLD AUTO: 48 % (ref 35–48)
HDLC SERPL-MCNC: 45 MG/DL (ref 40–59)
HGB BLD-MCNC: 15.3 G/DL (ref 12–16)
IMM GRANULOCYTES # BLD AUTO: 0.03 X10(3) UL (ref 0–1)
IMM GRANULOCYTES NFR BLD: 0.6 %
LDLC SERPL CALC-MCNC: 131 MG/DL (ref ?–100)
LYMPHOCYTES # BLD AUTO: 1.51 X10(3) UL (ref 1–4)
LYMPHOCYTES NFR BLD AUTO: 30.9 %
M PROTEIN MFR SERPL ELPH: 7.3 G/DL (ref 6.4–8.2)
MCH RBC QN AUTO: 30.4 PG (ref 26–34)
MCHC RBC AUTO-ENTMCNC: 31.9 G/DL (ref 31–37)
MCV RBC AUTO: 95.2 FL (ref 80–100)
MONOCYTES # BLD AUTO: 0.51 X10(3) UL (ref 0.1–1)
MONOCYTES NFR BLD AUTO: 10.5 %
NEUTROPHILS # BLD AUTO: 2.36 X10 (3) UL (ref 1.5–7.7)
NEUTROPHILS # BLD AUTO: 2.36 X10(3) UL (ref 1.5–7.7)
NEUTROPHILS NFR BLD AUTO: 48.4 %
NONHDLC SERPL-MCNC: 163 MG/DL (ref ?–130)
OSMOLALITY SERPL CALC.SUM OF ELEC: 291 MOSM/KG (ref 275–295)
PATIENT FASTING Y/N/NP: YES
PATIENT FASTING Y/N/NP: YES
PLATELET # BLD AUTO: 422 10(3)UL (ref 150–450)
POTASSIUM SERPL-SCNC: 4.4 MMOL/L (ref 3.5–5.1)
RBC # BLD AUTO: 5.04 X10(6)UL (ref 3.8–5.3)
SODIUM SERPL-SCNC: 140 MMOL/L (ref 136–145)
TRIGL SERPL-MCNC: 159 MG/DL (ref 30–149)
TSI SER-ACNC: 2.45 MIU/ML (ref 0.36–3.74)
VLDLC SERPL CALC-MCNC: 32 MG/DL (ref 0–30)
WBC # BLD AUTO: 4.9 X10(3) UL (ref 4–11)

## 2020-02-26 PROCEDURE — 84443 ASSAY THYROID STIM HORMONE: CPT

## 2020-02-26 PROCEDURE — 85025 COMPLETE CBC W/AUTO DIFF WBC: CPT

## 2020-02-26 PROCEDURE — 80053 COMPREHEN METABOLIC PANEL: CPT

## 2020-02-26 PROCEDURE — 36415 COLL VENOUS BLD VENIPUNCTURE: CPT

## 2020-02-26 PROCEDURE — 80061 LIPID PANEL: CPT

## 2020-03-07 DIAGNOSIS — N32.81 OVERACTIVE BLADDER DUE TO PROLAPSE OF FEMALE GENITAL ORGAN: ICD-10-CM

## 2020-03-07 DIAGNOSIS — N81.9 OVERACTIVE BLADDER DUE TO PROLAPSE OF FEMALE GENITAL ORGAN: ICD-10-CM

## 2020-03-09 RX ORDER — TROSPIUM CHLORIDE 20 MG/1
TABLET, FILM COATED ORAL
Qty: 90 TABLET | Refills: 0 | Status: SHIPPED | OUTPATIENT
Start: 2020-03-09 | End: 2020-06-08

## 2020-03-09 NOTE — TELEPHONE ENCOUNTER
Rx Request  Trospium Chloride 20 MG Oral Tab    Disp:      90              R: 0     Associated Dx: Overactive bladder due to prolapse of female genital organ    Last Visit: 02/04/2020      Last Refilled: 07/29/2019

## 2020-03-19 ENCOUNTER — TELEPHONE (OUTPATIENT)
Dept: FAMILY MEDICINE CLINIC | Facility: CLINIC | Age: 75
End: 2020-03-19

## 2020-03-19 RX ORDER — CYCLOBENZAPRINE HCL 5 MG
5 TABLET ORAL 3 TIMES DAILY PRN
Qty: 15 TABLET | Refills: 0 | Status: SHIPPED | OUTPATIENT
Start: 2020-03-19 | End: 2020-03-24

## 2020-03-19 NOTE — TELEPHONE ENCOUNTER
Olga Lidia Gouty for flexeril 5mg TID prn back spasms #15 no refills. Continue ibuprofen 600mg TID x 3 days scheduled, for inflammation. [facility] 1-2 times a day for a couple of days.

## 2020-03-19 NOTE — TELEPHONE ENCOUNTER
Pt states Monday cleaning closet and hurt back taking stuff off shelves, pain to mid/right back,  Using ibu with no relief, using heating pad- temporary relief  ?  Pulled muscle

## 2020-06-08 DIAGNOSIS — N81.9 OVERACTIVE BLADDER DUE TO PROLAPSE OF FEMALE GENITAL ORGAN: ICD-10-CM

## 2020-06-08 DIAGNOSIS — N32.81 OVERACTIVE BLADDER DUE TO PROLAPSE OF FEMALE GENITAL ORGAN: ICD-10-CM

## 2020-06-08 RX ORDER — TROSPIUM CHLORIDE 20 MG/1
TABLET, FILM COATED ORAL
Qty: 90 TABLET | Refills: 0 | Status: SHIPPED | OUTPATIENT
Start: 2020-06-08 | End: 2020-09-08

## 2020-06-08 NOTE — TELEPHONE ENCOUNTER
Rx Request  Trospium Chloride 20 MG Oral Tab    Disp:      90             R: 0    Last Visit:02/04/2020    Last Refilled: 03/09/2020

## 2020-07-01 ENCOUNTER — VIRTUAL PHONE E/M (OUTPATIENT)
Dept: FAMILY MEDICINE CLINIC | Facility: CLINIC | Age: 75
End: 2020-07-01
Payer: MEDICARE

## 2020-07-01 ENCOUNTER — LAB ENCOUNTER (OUTPATIENT)
Dept: LAB | Facility: HOSPITAL | Age: 75
End: 2020-07-01
Attending: FAMILY MEDICINE
Payer: MEDICARE

## 2020-07-01 DIAGNOSIS — R05.9 COUGH: ICD-10-CM

## 2020-07-01 DIAGNOSIS — Z20.822 CLOSE EXPOSURE TO COVID-19 VIRUS: ICD-10-CM

## 2020-07-01 DIAGNOSIS — Z20.822 CLOSE EXPOSURE TO COVID-19 VIRUS: Primary | ICD-10-CM

## 2020-07-01 LAB — SARS-COV-2 RNA RESP QL NAA+PROBE: NOT DETECTED

## 2020-07-01 PROCEDURE — 99441 PHONE E/M BY PHYS 5-10 MIN: CPT | Performed by: FAMILY MEDICINE

## 2020-07-01 NOTE — PROGRESS NOTES
Virtual Telephone Check-In    Nando Wylie verbally consents to a Virtual/Telephone Check-In visit on 07/01/20. Patient has been referred to the [facility] website at [email or web address] to review the yearly Consent to Treat document.     Patient [place] symptoms. If you have possible or confirmed COVID-19, you should self-isolate at home for at least 14 days and follow the recommendations below. 10 Ways to Manage Your Health at Home      1.  Stay home from work, school, and away from other public breathing, or unrelenting fevers greater than 100.4 degrees Fahrenheit, you should contact your health care provider before seeking further care. Process measures to keep everyone safe in this difficult time are changing frequently.  Your healthcare provid [employer]Exchange.nl. pdf  [employer].cy  http://www.[facility].gov/topics-services/diseases-and-conditions/dise

## 2020-07-31 ENCOUNTER — HOSPITAL ENCOUNTER (OUTPATIENT)
Dept: ULTRASOUND IMAGING | Age: 75
Discharge: HOME | End: 2020-07-31
Attending: INTERNAL MEDICINE
Payer: MEDICARE

## 2020-07-31 ENCOUNTER — HOSPITAL ENCOUNTER (OUTPATIENT)
Dept: MAMMOGRAPHY | Age: 75
Discharge: HOME | End: 2020-07-31
Attending: INTERNAL MEDICINE
Payer: MEDICARE

## 2020-07-31 ENCOUNTER — HOSPITAL ENCOUNTER (OUTPATIENT)
Dept: BONE DENSITY | Age: 75
Discharge: HOME | End: 2020-07-31
Attending: INTERNAL MEDICINE
Payer: MEDICARE

## 2020-07-31 DIAGNOSIS — Z12.31 ENCOUNTER FOR SCREENING MAMMOGRAM FOR BREAST CANCER: ICD-10-CM

## 2020-07-31 DIAGNOSIS — N64.4 BREAST PAIN, LEFT: ICD-10-CM

## 2020-07-31 DIAGNOSIS — N60.11 FIBROCYSTIC DISEASE OF BOTH BREASTS: ICD-10-CM

## 2020-07-31 DIAGNOSIS — Z00.00 ROUTINE GENERAL MEDICAL EXAMINATION AT A HEALTH CARE FACILITY: ICD-10-CM

## 2020-07-31 DIAGNOSIS — N60.12 FIBROCYSTIC DISEASE OF BOTH BREASTS: ICD-10-CM

## 2020-07-31 DIAGNOSIS — M81.0 OSTEOPOROSIS: ICD-10-CM

## 2020-07-31 PROCEDURE — 77066 DX MAMMO INCL CAD BI: CPT | Performed by: INTERNAL MEDICINE

## 2020-07-31 PROCEDURE — 77080 DXA BONE DENSITY AXIAL: CPT | Performed by: INTERNAL MEDICINE

## 2020-07-31 PROCEDURE — 77062 BREAST TOMOSYNTHESIS BI: CPT | Performed by: INTERNAL MEDICINE

## 2020-07-31 PROCEDURE — 76642 ULTRASOUND BREAST LIMITED: CPT | Performed by: INTERNAL MEDICINE

## 2020-09-07 DIAGNOSIS — N81.9 OVERACTIVE BLADDER DUE TO PROLAPSE OF FEMALE GENITAL ORGAN: ICD-10-CM

## 2020-09-07 DIAGNOSIS — N32.81 OVERACTIVE BLADDER DUE TO PROLAPSE OF FEMALE GENITAL ORGAN: ICD-10-CM

## 2020-09-08 RX ORDER — TROSPIUM CHLORIDE 20 MG/1
TABLET, FILM COATED ORAL
Qty: 90 TABLET | Refills: 0 | Status: SHIPPED | OUTPATIENT
Start: 2020-09-08 | End: 2020-12-07

## 2020-09-18 ENCOUNTER — NURSE ONLY (OUTPATIENT)
Dept: FAMILY MEDICINE CLINIC | Facility: CLINIC | Age: 75
End: 2020-09-18
Payer: MEDICARE

## 2020-09-18 PROCEDURE — 90662 IIV NO PRSV INCREASED AG IM: CPT | Performed by: FAMILY MEDICINE

## 2020-09-18 PROCEDURE — G0008 ADMIN INFLUENZA VIRUS VAC: HCPCS | Performed by: FAMILY MEDICINE

## 2020-10-11 DIAGNOSIS — K21.9 GASTROESOPHAGEAL REFLUX DISEASE WITHOUT ESOPHAGITIS: ICD-10-CM

## 2020-10-12 RX ORDER — OMEPRAZOLE 20 MG/1
CAPSULE, DELAYED RELEASE ORAL
Qty: 90 CAPSULE | Refills: 0 | Status: SHIPPED | OUTPATIENT
Start: 2020-10-12 | End: 2021-02-03

## 2020-10-12 NOTE — TELEPHONE ENCOUNTER
Rx Request  omeprazole 20 MG Oral Capsule Delayed Release    Disp:    90                R: 1    Associated Dx: GERD    Last Visit: 07/01/2020    Last Refilled: 02/04/2020

## 2020-11-05 DIAGNOSIS — I10 ESSENTIAL HYPERTENSION: ICD-10-CM

## 2020-11-05 RX ORDER — AMLODIPINE BESYLATE AND BENAZEPRIL HYDROCHLORIDE 5; 10 MG/1; MG/1
CAPSULE ORAL
Qty: 90 CAPSULE | Refills: 0 | Status: SHIPPED | OUTPATIENT
Start: 2020-11-05 | End: 2021-02-03

## 2020-12-07 DIAGNOSIS — N81.9 OVERACTIVE BLADDER DUE TO PROLAPSE OF FEMALE GENITAL ORGAN: ICD-10-CM

## 2020-12-07 DIAGNOSIS — N32.81 OVERACTIVE BLADDER DUE TO PROLAPSE OF FEMALE GENITAL ORGAN: ICD-10-CM

## 2020-12-07 RX ORDER — TROSPIUM CHLORIDE 20 MG/1
TABLET, FILM COATED ORAL
Qty: 90 TABLET | Refills: 0 | Status: SHIPPED | OUTPATIENT
Start: 2020-12-07 | End: 2021-02-08

## 2021-02-03 DIAGNOSIS — K21.9 GASTROESOPHAGEAL REFLUX DISEASE WITHOUT ESOPHAGITIS: ICD-10-CM

## 2021-02-03 DIAGNOSIS — I10 ESSENTIAL HYPERTENSION: ICD-10-CM

## 2021-02-03 RX ORDER — OMEPRAZOLE 20 MG/1
CAPSULE, DELAYED RELEASE ORAL
Qty: 90 CAPSULE | Refills: 0 | Status: SHIPPED | OUTPATIENT
Start: 2021-02-03 | End: 2021-02-08

## 2021-02-03 RX ORDER — AMLODIPINE BESYLATE AND BENAZEPRIL HYDROCHLORIDE 5; 10 MG/1; MG/1
CAPSULE ORAL
Qty: 90 CAPSULE | Refills: 0 | Status: SHIPPED | OUTPATIENT
Start: 2021-02-03 | End: 2021-02-08

## 2021-02-07 PROBLEM — M81.0 SENILE OSTEOPOROSIS: Status: RESOLVED | Noted: 2018-11-16 | Resolved: 2021-02-07

## 2021-02-08 ENCOUNTER — OFFICE VISIT (OUTPATIENT)
Dept: FAMILY MEDICINE CLINIC | Facility: CLINIC | Age: 76
End: 2021-02-08
Payer: MEDICARE

## 2021-02-08 VITALS
TEMPERATURE: 98 F | HEART RATE: 88 BPM | DIASTOLIC BLOOD PRESSURE: 70 MMHG | RESPIRATION RATE: 18 BRPM | BODY MASS INDEX: 24.11 KG/M2 | HEIGHT: 61.8 IN | OXYGEN SATURATION: 99 % | WEIGHT: 131 LBS | SYSTOLIC BLOOD PRESSURE: 134 MMHG

## 2021-02-08 DIAGNOSIS — N81.9 OVERACTIVE BLADDER DUE TO PROLAPSE OF FEMALE GENITAL ORGAN: ICD-10-CM

## 2021-02-08 DIAGNOSIS — Z12.31 ENCOUNTER FOR SCREENING MAMMOGRAM FOR HIGH-RISK PATIENT: ICD-10-CM

## 2021-02-08 DIAGNOSIS — Z87.891 HX OF TOBACCO USE, PRESENTING HAZARDS TO HEALTH: ICD-10-CM

## 2021-02-08 DIAGNOSIS — N32.81 OVERACTIVE BLADDER DUE TO PROLAPSE OF FEMALE GENITAL ORGAN: ICD-10-CM

## 2021-02-08 DIAGNOSIS — M81.8 AGE-RELATED OSTEOPOROSIS WITHOUT FRACTURE: ICD-10-CM

## 2021-02-08 DIAGNOSIS — E55.9 VITAMIN D DEFICIENCY: ICD-10-CM

## 2021-02-08 DIAGNOSIS — Z00.00 ENCOUNTER FOR ANNUAL HEALTH EXAMINATION: Primary | ICD-10-CM

## 2021-02-08 DIAGNOSIS — Z20.822 CLOSE EXPOSURE TO COVID-19 VIRUS: ICD-10-CM

## 2021-02-08 DIAGNOSIS — K21.9 GASTROESOPHAGEAL REFLUX DISEASE WITHOUT ESOPHAGITIS: ICD-10-CM

## 2021-02-08 DIAGNOSIS — R07.89 OTHER CHEST PAIN: ICD-10-CM

## 2021-02-08 DIAGNOSIS — I10 ESSENTIAL HYPERTENSION: ICD-10-CM

## 2021-02-08 PROCEDURE — 99213 OFFICE O/P EST LOW 20 MIN: CPT | Performed by: FAMILY MEDICINE

## 2021-02-08 PROCEDURE — G0439 PPPS, SUBSEQ VISIT: HCPCS | Performed by: FAMILY MEDICINE

## 2021-02-08 RX ORDER — TROSPIUM CHLORIDE 20 MG/1
20 TABLET, FILM COATED ORAL DAILY
Qty: 90 TABLET | Refills: 1 | Status: SHIPPED | OUTPATIENT
Start: 2021-02-08 | End: 2021-06-22

## 2021-02-08 RX ORDER — AMLODIPINE BESYLATE AND BENAZEPRIL HYDROCHLORIDE 5; 10 MG/1; MG/1
1 CAPSULE ORAL DAILY
Qty: 90 CAPSULE | Refills: 1 | Status: SHIPPED | OUTPATIENT
Start: 2021-02-08 | End: 2021-10-29

## 2021-02-08 RX ORDER — AMLODIPINE BESYLATE 5 MG/1
5 TABLET ORAL DAILY
Qty: 30 TABLET | Refills: 0 | Status: SHIPPED | OUTPATIENT
Start: 2021-02-08

## 2021-02-08 RX ORDER — OMEPRAZOLE 20 MG/1
20 CAPSULE, DELAYED RELEASE ORAL DAILY
Qty: 90 CAPSULE | Refills: 1 | Status: SHIPPED | OUTPATIENT
Start: 2021-02-08 | End: 2021-10-29

## 2021-02-08 NOTE — PATIENT INSTRUCTIONS
[facility]any Todd's SCREENING SCHEDULE   Tests on this list are recommended by your physician but may not be covered, or covered at this frequency, by your insurer. Please check with your insurance carrier before scheduling to verify coverage.    PREVENTATIVE screening (once between ages 73-68) IPPE only No results found for this or any previous visit.  Limited to patients who meet one of the following criteria:   • Men who are 73-68 years old and have smoked more than 100 cigarettes in their lifetime   • Anyone Annually to at least age 76, and as needed after 76 There are no preventive care reminders to display for this patient.  Please get this Mammogram regularly   Immunizations      Influenza  Covered Annually Orders placed or performed in visit on 09/18/20   • including definitions of the different types of Advance Directives. It also has the State forms available on it's website for anyone to review and print using their home computer and printer. (the forms are also available in [address])  www. [employer]. or Koh Intro Text (From The.....): A KOH prep was ordered and evaluated from the Detail Level: Detailed Koh Procedure Text (Tissue Harvesting Technique): A 15-blade scalpel was used to scrape the skin. The skin scrapings were placed on a glass slide, covered with a coverslip and a KOH solution was applied. Cpt Desired:  Showing: no hyphae

## 2021-02-08 NOTE — PROGRESS NOTES
HPI:   Darshana Way is a 66year old female who presents for a Medicare Subsequent Annual Wellness visit (Pt already had Initial Annual Wellness).     Pt also here with   Gastroesophageal reflux disease without esophagitis     Essential hypertension Epic.   Advance care planning including the explanation and discussion of advance directives standard forms performed Face to Face with patient and Family/surrogate (if present), and forms available to patient in AVS     She does NOT have a Power of Attorn mg total) by mouth daily. •  Trospium Chloride 20 MG Oral Tab, Take 1 tablet (20 mg total) by mouth daily. •  amLODIPine Besylate 5 MG Oral Tab, Take 1 tablet (5 mg total) by mouth daily.     •  Denosumab 60 MG/ML Subcutaneous Solution Prefilled Syrin pain  NEURO: denies headaches  PSYCHE: denies depression or anxiety  HEMATOLOGIC: denies hx of anemia  ENDOCRINE: denies thyroid history  ALL/ASTHMA: denies hx of allergy or asthma    EXAM:   /70   Pulse 88   Temp 98.1 °F (36.7 °C) (Temporal)   Resp axillary nodes normal   Neurologic: Normal       Vaccination History     Immunization History   Administered Date(s) Administered   • FLU VAC High Dose 65 YRS & Older PRSV Free (10181) 10/01/2019, 09/18/2020   • FLUAD High Dose 72 yr and older (62034) 11/1 total) by mouth daily. Vitamin D deficiency- monitor   -     OFFICE/OUTPT VISIT,KAREEM BENDER III  -     VITAMIN D, 25-HYDROXY; Future    Close exposure to COVID-19 virus- check   -     OFFICE/OUTPT VISIT,KAREEM BENDER III  -     SARS-COV-2 TOTAL ANTIBODY;  Future Electrocardiogram date       Colorectal Cancer Screening      Colonoscopy Screen every 10 years There are no preventive care reminders to display for this patient.  Update Health Maintenance if applicable    Flex Sigmoidoscopy Screen every 10 years No resul B No vaccine history found This may be covered with your prescription benefits, but Medicare does not cover unless Medically needed    Zoster  Not covered by Medicare Part B No vaccine history found This may be covered with your pharmacy  prescription bene

## 2021-03-23 ENCOUNTER — TELEPHONE (OUTPATIENT)
Dept: FAMILY MEDICINE CLINIC | Facility: CLINIC | Age: 76
End: 2021-03-23

## 2021-04-12 ENCOUNTER — LAB ENCOUNTER (OUTPATIENT)
Dept: LAB | Age: 76
End: 2021-04-12
Attending: FAMILY MEDICINE
Payer: MEDICARE

## 2021-04-12 DIAGNOSIS — K21.9 GASTROESOPHAGEAL REFLUX DISEASE WITHOUT ESOPHAGITIS: ICD-10-CM

## 2021-04-12 DIAGNOSIS — Z20.822 CLOSE EXPOSURE TO COVID-19 VIRUS: ICD-10-CM

## 2021-04-12 DIAGNOSIS — I10 ESSENTIAL HYPERTENSION: ICD-10-CM

## 2021-04-12 DIAGNOSIS — E55.9 VITAMIN D DEFICIENCY: ICD-10-CM

## 2021-04-12 DIAGNOSIS — M81.8 AGE-RELATED OSTEOPOROSIS WITHOUT FRACTURE: ICD-10-CM

## 2021-04-12 PROCEDURE — 84439 ASSAY OF FREE THYROXINE: CPT

## 2021-04-12 PROCEDURE — 80053 COMPREHEN METABOLIC PANEL: CPT

## 2021-04-12 PROCEDURE — 85025 COMPLETE CBC W/AUTO DIFF WBC: CPT

## 2021-04-12 PROCEDURE — 36415 COLL VENOUS BLD VENIPUNCTURE: CPT

## 2021-04-12 PROCEDURE — 86769 SARS-COV-2 COVID-19 ANTIBODY: CPT

## 2021-04-12 PROCEDURE — 80061 LIPID PANEL: CPT

## 2021-04-12 PROCEDURE — 82607 VITAMIN B-12: CPT

## 2021-04-12 PROCEDURE — 82306 VITAMIN D 25 HYDROXY: CPT

## 2021-04-12 PROCEDURE — 83735 ASSAY OF MAGNESIUM: CPT

## 2021-04-12 PROCEDURE — 84443 ASSAY THYROID STIM HORMONE: CPT

## 2021-04-21 ENCOUNTER — TELEPHONE (OUTPATIENT)
Dept: FAMILY MEDICINE CLINIC | Facility: CLINIC | Age: 76
End: 2021-04-21

## 2021-04-21 ENCOUNTER — TELEMEDICINE (OUTPATIENT)
Dept: FAMILY MEDICINE CLINIC | Facility: CLINIC | Age: 76
End: 2021-04-21
Payer: MEDICARE

## 2021-04-21 DIAGNOSIS — G44.009 CLUSTER HEADACHE, NOT INTRACTABLE, UNSPECIFIED CHRONICITY PATTERN: Primary | ICD-10-CM

## 2021-04-21 DIAGNOSIS — E53.8 B12 DEFICIENCY: ICD-10-CM

## 2021-04-21 DIAGNOSIS — E78.00 ELEVATED CHOLESTEROL: ICD-10-CM

## 2021-04-21 DIAGNOSIS — Z82.49 FAMILY HISTORY OF EARLY CAD: ICD-10-CM

## 2021-04-21 DIAGNOSIS — R09.82 POST-NASAL DRAINAGE: ICD-10-CM

## 2021-04-21 DIAGNOSIS — R06.02 SOB (SHORTNESS OF BREATH): ICD-10-CM

## 2021-04-21 PROCEDURE — 99214 OFFICE O/P EST MOD 30 MIN: CPT | Performed by: FAMILY MEDICINE

## 2021-04-21 RX ORDER — CYANOCOBALAMIN 1000 UG/ML
1000 INJECTION INTRAMUSCULAR; SUBCUTANEOUS ONCE
Status: COMPLETED | OUTPATIENT
Start: 2021-04-21 | End: 2021-05-12

## 2021-04-21 RX ORDER — FLUTICASONE PROPIONATE 50 MCG
2 SPRAY, SUSPENSION (ML) NASAL NIGHTLY
Qty: 1 BOTTLE | Refills: 0 | Status: SHIPPED | OUTPATIENT
Start: 2021-04-21

## 2021-04-21 NOTE — TELEPHONE ENCOUNTER
Patient called to schedule B12 and EKG per MARILOU from televisit today. I noticed that she also has a stress test order.     Should she do the EKG with her B12 as I scheduled or should she be doing EKG at lab or with Cardiology and Stress Test.    Appt summer

## 2021-04-21 NOTE — PROGRESS NOTES
This visit is conducted using Telemedicine with live, interactive video and audio.     Telehealth outside of [address] Verbal Consent   I conducted a telehealth visit with Ernesto Finch today, 04/21/21, which was completed using two-way, real-time Has grandchildren 1 day per week     Low B12     HA's every evening- no associated vision changes/ various location   No NV  No light or sound sensitivity   No focal neuro symptoms   Not checking bp regularly   Pt feels she does clench her jaw   One gl administration details 1 mL 0   • aspirin 81 MG Oral Tab Take 1 tablet (81 mg total) by mouth daily. 365 tablet 0   • Cholecalciferol (VITAMIN D) 1000 UNITS Oral Tab Take 1 tablet by mouth daily. 365 tablet 0   • IBUPROFEN by Does not apply route.         P alert, appears stated age and cooperative, Normocephalic, without obvious abnormality, atraumatic, lips, mucosa, and tongue normal; teeth and gums normal, Speaking in full sentences comfortably, Normal work of breathing, Skin color, texture, turgor noah

## 2021-04-27 ENCOUNTER — TELEPHONE (OUTPATIENT)
Dept: FAMILY MEDICINE CLINIC | Facility: CLINIC | Age: 76
End: 2021-04-27

## 2021-04-27 ENCOUNTER — NURSE ONLY (OUTPATIENT)
Dept: FAMILY MEDICINE CLINIC | Facility: CLINIC | Age: 76
End: 2021-04-27
Payer: MEDICARE

## 2021-04-27 DIAGNOSIS — Z01.818 ENCOUNTER FOR PREADMISSION TESTING: Primary | ICD-10-CM

## 2021-04-27 DIAGNOSIS — E53.8 B12 DEFICIENCY: ICD-10-CM

## 2021-04-27 PROCEDURE — 96372 THER/PROPH/DIAG INJ SC/IM: CPT | Performed by: FAMILY MEDICINE

## 2021-04-27 RX ORDER — CYANOCOBALAMIN 1000 UG/ML
1000 INJECTION INTRAMUSCULAR; SUBCUTANEOUS ONCE
Status: COMPLETED | OUTPATIENT
Start: 2021-04-27 | End: 2021-04-27

## 2021-04-27 RX ADMIN — CYANOCOBALAMIN 1000 MCG: 1000 INJECTION INTRAMUSCULAR; SUBCUTANEOUS at 12:05:00

## 2021-05-04 ENCOUNTER — NURSE ONLY (OUTPATIENT)
Dept: FAMILY MEDICINE CLINIC | Facility: CLINIC | Age: 76
End: 2021-05-04
Payer: MEDICARE

## 2021-05-04 DIAGNOSIS — E53.8 B12 DEFICIENCY: Primary | ICD-10-CM

## 2021-05-04 PROCEDURE — 96372 THER/PROPH/DIAG INJ SC/IM: CPT | Performed by: FAMILY MEDICINE

## 2021-05-04 RX ORDER — CYANOCOBALAMIN 1000 UG/ML
1000 INJECTION INTRAMUSCULAR; SUBCUTANEOUS ONCE
Status: COMPLETED | OUTPATIENT
Start: 2021-05-04 | End: 2021-05-04

## 2021-05-04 RX ADMIN — CYANOCOBALAMIN 1000 MCG: 1000 INJECTION INTRAMUSCULAR; SUBCUTANEOUS at 11:20:00

## 2021-05-12 ENCOUNTER — NURSE ONLY (OUTPATIENT)
Dept: FAMILY MEDICINE CLINIC | Facility: CLINIC | Age: 76
End: 2021-05-12
Payer: MEDICARE

## 2021-05-12 PROCEDURE — 96372 THER/PROPH/DIAG INJ SC/IM: CPT | Performed by: FAMILY MEDICINE

## 2021-05-12 RX ADMIN — CYANOCOBALAMIN 1000 MCG: 1000 INJECTION INTRAMUSCULAR; SUBCUTANEOUS at 11:25:00

## 2021-05-18 ENCOUNTER — LAB ENCOUNTER (OUTPATIENT)
Dept: LAB | Age: 76
End: 2021-05-18
Attending: FAMILY MEDICINE
Payer: MEDICARE

## 2021-05-18 ENCOUNTER — NURSE ONLY (OUTPATIENT)
Dept: FAMILY MEDICINE CLINIC | Facility: CLINIC | Age: 76
End: 2021-05-18
Payer: MEDICARE

## 2021-05-18 DIAGNOSIS — E53.8 B12 DEFICIENCY: Primary | ICD-10-CM

## 2021-05-18 DIAGNOSIS — Z82.49 FAMILY HISTORY OF EARLY CAD: ICD-10-CM

## 2021-05-18 DIAGNOSIS — R06.02 SOB (SHORTNESS OF BREATH): ICD-10-CM

## 2021-05-18 PROCEDURE — 96372 THER/PROPH/DIAG INJ SC/IM: CPT | Performed by: FAMILY MEDICINE

## 2021-05-18 RX ORDER — CYANOCOBALAMIN 1000 UG/ML
1000 INJECTION INTRAMUSCULAR; SUBCUTANEOUS ONCE
Status: COMPLETED | OUTPATIENT
Start: 2021-05-18 | End: 2021-05-18

## 2021-05-18 RX ADMIN — CYANOCOBALAMIN 1000 MCG: 1000 INJECTION INTRAMUSCULAR; SUBCUTANEOUS at 11:17:00

## 2021-05-21 ENCOUNTER — HOSPITAL ENCOUNTER (OUTPATIENT)
Dept: CV DIAGNOSTICS | Age: 76
Discharge: HOME | End: 2021-05-21
Attending: FAMILY MEDICINE
Payer: MEDICARE

## 2021-05-21 DIAGNOSIS — Z82.49 FAMILY HISTORY OF EARLY CAD: ICD-10-CM

## 2021-05-21 DIAGNOSIS — R06.02 SOB (SHORTNESS OF BREATH): ICD-10-CM

## 2021-05-21 PROCEDURE — 93350 STRESS TTE ONLY: CPT | Performed by: FAMILY MEDICINE

## 2021-05-21 PROCEDURE — 93018 CV STRESS TEST I&R ONLY: CPT | Performed by: FAMILY MEDICINE

## 2021-05-21 PROCEDURE — 93017 CV STRESS TEST TRACING ONLY: CPT | Performed by: FAMILY MEDICINE

## 2021-05-26 VITALS
TEMPERATURE: 97.7 F | SYSTOLIC BLOOD PRESSURE: 141 MMHG | RESPIRATION RATE: 18 BRPM | OXYGEN SATURATION: 97 % | DIASTOLIC BLOOD PRESSURE: 71 MMHG | HEART RATE: 71 BPM

## 2021-06-17 ENCOUNTER — TELEPHONE (OUTPATIENT)
Dept: FAMILY MEDICINE CLINIC | Facility: CLINIC | Age: 76
End: 2021-06-17

## 2021-06-17 ENCOUNTER — NURSE ONLY (OUTPATIENT)
Dept: FAMILY MEDICINE CLINIC | Facility: CLINIC | Age: 76
End: 2021-06-17
Payer: MEDICARE

## 2021-06-17 DIAGNOSIS — E53.8 B12 DEFICIENCY: Primary | ICD-10-CM

## 2021-06-17 PROCEDURE — 96372 THER/PROPH/DIAG INJ SC/IM: CPT | Performed by: FAMILY MEDICINE

## 2021-06-17 RX ORDER — CYANOCOBALAMIN 1000 UG/ML
1000 INJECTION INTRAMUSCULAR; SUBCUTANEOUS ONCE
Status: COMPLETED | OUTPATIENT
Start: 2021-06-17 | End: 2021-06-17

## 2021-06-17 RX ADMIN — CYANOCOBALAMIN 1000 MCG: 1000 INJECTION INTRAMUSCULAR; SUBCUTANEOUS at 13:51:00

## 2021-06-22 PROBLEM — N81.10 PELVIC ORGAN PROLAPSE QUANTIFICATION STAGE 3 CYSTOCELE: Status: ACTIVE | Noted: 2021-06-22

## 2021-07-16 ENCOUNTER — NURSE ONLY (OUTPATIENT)
Dept: FAMILY MEDICINE CLINIC | Facility: CLINIC | Age: 76
End: 2021-07-16
Payer: MEDICARE

## 2021-07-16 DIAGNOSIS — E53.8 B12 DEFICIENCY: Primary | ICD-10-CM

## 2021-07-16 RX ORDER — CYANOCOBALAMIN 1000 UG/ML
1000 INJECTION INTRAMUSCULAR; SUBCUTANEOUS ONCE
Status: COMPLETED | OUTPATIENT
Start: 2021-07-16 | End: 2021-08-13

## 2021-08-13 ENCOUNTER — NURSE ONLY (OUTPATIENT)
Dept: FAMILY MEDICINE CLINIC | Facility: CLINIC | Age: 76
End: 2021-08-13
Payer: MEDICARE

## 2021-08-13 ENCOUNTER — TELEPHONE (OUTPATIENT)
Dept: FAMILY MEDICINE CLINIC | Facility: CLINIC | Age: 76
End: 2021-08-13

## 2021-08-13 PROCEDURE — 96372 THER/PROPH/DIAG INJ SC/IM: CPT | Performed by: FAMILY MEDICINE

## 2021-08-13 RX ADMIN — CYANOCOBALAMIN 1000 MCG: 1000 INJECTION INTRAMUSCULAR; SUBCUTANEOUS at 12:18:00

## 2021-09-10 ENCOUNTER — NURSE ONLY (OUTPATIENT)
Dept: FAMILY MEDICINE CLINIC | Facility: CLINIC | Age: 76
End: 2021-09-10
Payer: MEDICARE

## 2021-09-10 ENCOUNTER — TELEPHONE (OUTPATIENT)
Dept: FAMILY MEDICINE CLINIC | Facility: CLINIC | Age: 76
End: 2021-09-10

## 2021-09-10 DIAGNOSIS — E53.8 B12 DEFICIENCY: Primary | ICD-10-CM

## 2021-09-10 PROCEDURE — 96372 THER/PROPH/DIAG INJ SC/IM: CPT | Performed by: FAMILY MEDICINE

## 2021-09-10 RX ORDER — CYANOCOBALAMIN 1000 UG/ML
1000 INJECTION INTRAMUSCULAR; SUBCUTANEOUS ONCE
Status: COMPLETED | OUTPATIENT
Start: 2021-09-10 | End: 2021-09-10

## 2021-09-10 RX ADMIN — CYANOCOBALAMIN 1000 MCG: 1000 INJECTION INTRAMUSCULAR; SUBCUTANEOUS at 11:21:00

## 2021-10-15 ENCOUNTER — NURSE ONLY (OUTPATIENT)
Dept: FAMILY MEDICINE CLINIC | Facility: CLINIC | Age: 76
End: 2021-10-15
Payer: MEDICARE

## 2021-10-15 DIAGNOSIS — E53.8 B12 DEFICIENCY: Primary | ICD-10-CM

## 2021-10-15 PROCEDURE — 96372 THER/PROPH/DIAG INJ SC/IM: CPT | Performed by: FAMILY MEDICINE

## 2021-10-15 PROCEDURE — 90662 IIV NO PRSV INCREASED AG IM: CPT | Performed by: FAMILY MEDICINE

## 2021-10-15 PROCEDURE — G0008 ADMIN INFLUENZA VIRUS VAC: HCPCS | Performed by: FAMILY MEDICINE

## 2021-10-15 RX ORDER — CYANOCOBALAMIN 1000 UG/ML
1000 INJECTION INTRAMUSCULAR; SUBCUTANEOUS ONCE
Status: COMPLETED | OUTPATIENT
Start: 2021-10-15 | End: 2021-10-15

## 2021-10-15 RX ADMIN — CYANOCOBALAMIN 1000 MCG: 1000 INJECTION INTRAMUSCULAR; SUBCUTANEOUS at 11:28:00

## 2021-10-28 DIAGNOSIS — I10 ESSENTIAL HYPERTENSION: ICD-10-CM

## 2021-10-28 DIAGNOSIS — K21.9 GASTROESOPHAGEAL REFLUX DISEASE WITHOUT ESOPHAGITIS: ICD-10-CM

## 2021-10-29 RX ORDER — AMLODIPINE BESYLATE AND BENAZEPRIL HYDROCHLORIDE 5; 10 MG/1; MG/1
CAPSULE ORAL
Qty: 90 CAPSULE | Refills: 0 | Status: SHIPPED | OUTPATIENT
Start: 2021-10-29 | End: 2022-01-25

## 2021-10-29 RX ORDER — OMEPRAZOLE 20 MG/1
CAPSULE, DELAYED RELEASE ORAL
Qty: 90 CAPSULE | Refills: 0 | Status: SHIPPED | OUTPATIENT
Start: 2021-10-29 | End: 2022-01-25

## 2021-11-12 ENCOUNTER — NURSE ONLY (OUTPATIENT)
Dept: FAMILY MEDICINE CLINIC | Facility: CLINIC | Age: 76
End: 2021-11-12
Payer: MEDICARE

## 2021-11-12 DIAGNOSIS — E53.8 B12 DEFICIENCY: Primary | ICD-10-CM

## 2021-11-12 PROCEDURE — 96372 THER/PROPH/DIAG INJ SC/IM: CPT | Performed by: FAMILY MEDICINE

## 2021-11-12 RX ORDER — CYANOCOBALAMIN 1000 UG/ML
1000 INJECTION INTRAMUSCULAR; SUBCUTANEOUS ONCE
Status: COMPLETED | OUTPATIENT
Start: 2021-11-12 | End: 2021-11-12

## 2021-11-12 RX ADMIN — CYANOCOBALAMIN 1000 MCG: 1000 INJECTION INTRAMUSCULAR; SUBCUTANEOUS at 09:49:00

## 2021-12-10 ENCOUNTER — NURSE ONLY (OUTPATIENT)
Dept: FAMILY MEDICINE CLINIC | Facility: CLINIC | Age: 76
End: 2021-12-10
Payer: MEDICARE

## 2021-12-10 DIAGNOSIS — Z23 NEED FOR VACCINATION: Primary | ICD-10-CM

## 2021-12-10 PROCEDURE — 96372 THER/PROPH/DIAG INJ SC/IM: CPT | Performed by: FAMILY MEDICINE

## 2021-12-10 RX ORDER — CYANOCOBALAMIN 1000 UG/ML
1000 INJECTION INTRAMUSCULAR; SUBCUTANEOUS ONCE
Status: COMPLETED | OUTPATIENT
Start: 2021-12-10 | End: 2021-12-10

## 2021-12-10 RX ADMIN — CYANOCOBALAMIN 1000 MCG: 1000 INJECTION INTRAMUSCULAR; SUBCUTANEOUS at 09:55:00

## 2022-01-06 ENCOUNTER — TELEMEDICINE (OUTPATIENT)
Dept: FAMILY MEDICINE CLINIC | Facility: CLINIC | Age: 77
End: 2022-01-06
Payer: MEDICARE

## 2022-01-06 DIAGNOSIS — J01.10 ACUTE NON-RECURRENT FRONTAL SINUSITIS: Primary | ICD-10-CM

## 2022-01-06 PROCEDURE — 99213 OFFICE O/P EST LOW 20 MIN: CPT | Performed by: FAMILY MEDICINE

## 2022-01-06 RX ORDER — AMOXICILLIN AND CLAVULANATE POTASSIUM 875; 125 MG/1; MG/1
1 TABLET, FILM COATED ORAL 2 TIMES DAILY
Qty: 20 TABLET | Refills: 0 | Status: SHIPPED | OUTPATIENT
Start: 2022-01-06 | End: 2022-01-16

## 2022-01-06 NOTE — PATIENT INSTRUCTIONS
Take a probiotic such as lactobacilis acidophilus while you are taking the antibiotic and for 7 days after the antibiotic is completed. This will help to prevent antibiotic associated diarrhea or C. Difficile.       db HBP for congestion

## 2022-01-06 NOTE — PROGRESS NOTES
Virtual Video Check-In    Gela Harris verbally consents to a Virtual/Telephone Check-In visit on 01/06/22. Patient understands and accepts financial responsibility for any deductible, co-insurance and/or co-pays associated with this service.     Dura the skin once. Historical documentation - see Epic Immunization Activity for administration details 1 mL 0   • Trospium Chloride 20 MG Oral Tab Take 1 tablet (20 mg total) by mouth daily.  90 tablet 3   • Fluticasone Propionate 50 MCG/ACT Nasal Suspension 2 reviewing test results completed on the same day. Please note that the following visit was completed using two-way, real-time interactive audio and/or video communication.   This has been done in good heather to provide continuity of care in the best

## 2022-01-25 ENCOUNTER — TELEPHONE (OUTPATIENT)
Dept: FAMILY MEDICINE CLINIC | Facility: CLINIC | Age: 77
End: 2022-01-25

## 2022-01-25 DIAGNOSIS — I10 ESSENTIAL HYPERTENSION: ICD-10-CM

## 2022-01-25 DIAGNOSIS — K21.9 GASTROESOPHAGEAL REFLUX DISEASE WITHOUT ESOPHAGITIS: ICD-10-CM

## 2022-01-25 RX ORDER — OMEPRAZOLE 20 MG/1
CAPSULE, DELAYED RELEASE ORAL
Qty: 90 CAPSULE | Refills: 0 | Status: SHIPPED | OUTPATIENT
Start: 2022-01-25

## 2022-01-25 RX ORDER — AMLODIPINE BESYLATE AND BENAZEPRIL HYDROCHLORIDE 5; 10 MG/1; MG/1
CAPSULE ORAL
Qty: 90 CAPSULE | Refills: 0 | Status: SHIPPED | OUTPATIENT
Start: 2022-01-25

## 2022-02-01 ENCOUNTER — NURSE ONLY (OUTPATIENT)
Dept: FAMILY MEDICINE CLINIC | Facility: CLINIC | Age: 77
End: 2022-02-01
Payer: MEDICARE

## 2022-02-01 DIAGNOSIS — E53.8 B12 DEFICIENCY: Primary | ICD-10-CM

## 2022-02-01 PROCEDURE — 96372 THER/PROPH/DIAG INJ SC/IM: CPT | Performed by: FAMILY MEDICINE

## 2022-02-01 RX ORDER — CYANOCOBALAMIN 1000 UG/ML
1000 INJECTION INTRAMUSCULAR; SUBCUTANEOUS ONCE
Status: COMPLETED | OUTPATIENT
Start: 2022-02-01 | End: 2022-02-01

## 2022-02-01 RX ADMIN — CYANOCOBALAMIN 1000 MCG: 1000 INJECTION INTRAMUSCULAR; SUBCUTANEOUS at 11:50:00

## 2022-02-15 ENCOUNTER — OFFICE VISIT (OUTPATIENT)
Dept: FAMILY MEDICINE CLINIC | Facility: CLINIC | Age: 77
End: 2022-02-15
Payer: MEDICARE

## 2022-02-15 ENCOUNTER — HOSPITAL ENCOUNTER (OUTPATIENT)
Dept: GENERAL RADIOLOGY | Age: 77
Discharge: HOME | End: 2022-02-15
Attending: FAMILY MEDICINE
Payer: MEDICARE

## 2022-02-15 VITALS
HEIGHT: 61.22 IN | HEART RATE: 70 BPM | RESPIRATION RATE: 18 BRPM | BODY MASS INDEX: 25.11 KG/M2 | DIASTOLIC BLOOD PRESSURE: 64 MMHG | SYSTOLIC BLOOD PRESSURE: 124 MMHG | WEIGHT: 133 LBS | TEMPERATURE: 98 F | OXYGEN SATURATION: 99 %

## 2022-02-15 DIAGNOSIS — I10 ESSENTIAL HYPERTENSION: ICD-10-CM

## 2022-02-15 DIAGNOSIS — E55.9 VITAMIN D DEFICIENCY: ICD-10-CM

## 2022-02-15 DIAGNOSIS — M79.671 RIGHT FOOT PAIN: ICD-10-CM

## 2022-02-15 DIAGNOSIS — N81.10 PELVIC ORGAN PROLAPSE QUANTIFICATION STAGE 3 CYSTOCELE: ICD-10-CM

## 2022-02-15 DIAGNOSIS — R92.8 ABNORMAL MAMMOGRAM: ICD-10-CM

## 2022-02-15 DIAGNOSIS — M81.8 AGE-RELATED OSTEOPOROSIS WITHOUT FRACTURE: ICD-10-CM

## 2022-02-15 DIAGNOSIS — N99.3 PROLAPSE, POST-HYSTERECTOMY: ICD-10-CM

## 2022-02-15 DIAGNOSIS — E78.00 ELEVATED CHOLESTEROL: ICD-10-CM

## 2022-02-15 DIAGNOSIS — Z13.820 SCREENING FOR OSTEOPOROSIS: ICD-10-CM

## 2022-02-15 DIAGNOSIS — Z00.00 ENCOUNTER FOR ANNUAL HEALTH EXAMINATION: Primary | ICD-10-CM

## 2022-02-15 DIAGNOSIS — E53.8 B12 DEFICIENCY: ICD-10-CM

## 2022-02-15 DIAGNOSIS — K21.9 GASTROESOPHAGEAL REFLUX DISEASE WITHOUT ESOPHAGITIS: ICD-10-CM

## 2022-02-15 DIAGNOSIS — Z78.0 ASYMPTOMATIC MENOPAUSE: ICD-10-CM

## 2022-02-15 PROCEDURE — 73630 X-RAY EXAM OF FOOT: CPT | Performed by: FAMILY MEDICINE

## 2022-02-15 PROCEDURE — 99213 OFFICE O/P EST LOW 20 MIN: CPT | Performed by: FAMILY MEDICINE

## 2022-02-15 PROCEDURE — G0439 PPPS, SUBSEQ VISIT: HCPCS | Performed by: FAMILY MEDICINE

## 2022-02-15 RX ORDER — AMLODIPINE BESYLATE AND BENAZEPRIL HYDROCHLORIDE 5; 10 MG/1; MG/1
1 CAPSULE ORAL DAILY
Qty: 90 CAPSULE | Refills: 2 | Status: SHIPPED | OUTPATIENT
Start: 2022-02-15

## 2022-03-02 ENCOUNTER — TELEPHONE (OUTPATIENT)
Dept: FAMILY MEDICINE CLINIC | Facility: CLINIC | Age: 77
End: 2022-03-02

## 2022-03-02 NOTE — TELEPHONE ENCOUNTER
[id number] Emily Hebert for MRI order   [id number] Emily Hebert for [facility]

## 2022-03-02 NOTE — TELEPHONE ENCOUNTER
1. Pt reports she is still having pain R foot with sprain. Rates pain 7/10. Has hard time sleeping. Still using cane to walk. Elevating when she can. Is afraid to drive yet since it's her R foot. Is using 2 extra strength tylenol 2-3x day as needed. Pt wants to know if LE thinks PT would help her recover faster? 2. Pt wants to know if LE would be agreeable to temporary parking placard so she doesn't have to walk far. Printed and put in LE bookshelf if agreeable. If we complete, call pt when done; she will .

## 2022-03-07 NOTE — TELEPHONE ENCOUNTER
Pt said her foot feels better and she's cancelling her MRI for now. She will reschedule only if Dr. Thierno Nelson thinks she needs to have it done.   Also, she no longer needs the parking placard

## 2022-03-29 ENCOUNTER — LAB ENCOUNTER (OUTPATIENT)
Dept: LAB | Age: 77
End: 2022-03-29
Attending: FAMILY MEDICINE
Payer: MEDICARE

## 2022-03-29 DIAGNOSIS — E78.00 ELEVATED CHOLESTEROL: ICD-10-CM

## 2022-03-29 DIAGNOSIS — I10 ESSENTIAL HYPERTENSION: ICD-10-CM

## 2022-03-29 DIAGNOSIS — E55.9 VITAMIN D DEFICIENCY: ICD-10-CM

## 2022-03-29 DIAGNOSIS — E53.8 B12 DEFICIENCY: ICD-10-CM

## 2022-03-29 DIAGNOSIS — K21.9 GASTROESOPHAGEAL REFLUX DISEASE WITHOUT ESOPHAGITIS: ICD-10-CM

## 2022-03-29 LAB
ALBUMIN SERPL-MCNC: 3.9 G/DL (ref 3.4–5)
ALBUMIN/GLOB SERPL: 1.4 {RATIO} (ref 1–2)
ALP LIVER SERPL-CCNC: 80 U/L
ALT SERPL-CCNC: 22 U/L
ANION GAP SERPL CALC-SCNC: 3 MMOL/L (ref 0–18)
AST SERPL-CCNC: 13 U/L (ref 15–37)
BASOPHILS # BLD AUTO: 0.05 X10(3) UL (ref 0–0.2)
BASOPHILS NFR BLD AUTO: 0.9 %
BILIRUB SERPL-MCNC: 0.5 MG/DL (ref 0.1–2)
BUN BLD-MCNC: 17 MG/DL (ref 7–18)
CALCIUM BLD-MCNC: 9.7 MG/DL (ref 8.5–10.1)
CHLORIDE SERPL-SCNC: 107 MMOL/L (ref 98–112)
CHOLEST SERPL-MCNC: 202 MG/DL (ref ?–200)
CO2 SERPL-SCNC: 30 MMOL/L (ref 21–32)
CREAT BLD-MCNC: 0.54 MG/DL
EOSINOPHIL # BLD AUTO: 0.21 X10(3) UL (ref 0–0.7)
EOSINOPHIL NFR BLD AUTO: 3.8 %
FASTING PATIENT LIPID ANSWER: YES
FASTING STATUS PATIENT QL REPORTED: YES
GLOBULIN PLAS-MCNC: 2.8 G/DL (ref 2.8–4.4)
GLUCOSE BLD-MCNC: 91 MG/DL (ref 70–99)
HCT VFR BLD AUTO: 46.3 %
HDLC SERPL-MCNC: 37 MG/DL (ref 40–59)
HGB BLD-MCNC: 14.4 G/DL
IMM GRANULOCYTES # BLD AUTO: 0.05 X10(3) UL (ref 0–1)
IMM GRANULOCYTES NFR BLD: 0.9 %
LDLC SERPL CALC-MCNC: 131 MG/DL (ref ?–100)
LYMPHOCYTES # BLD AUTO: 1.58 X10(3) UL (ref 1–4)
LYMPHOCYTES NFR BLD AUTO: 28.8 %
MAGNESIUM SERPL-MCNC: 2.3 MG/DL (ref 1.6–2.6)
MCH RBC QN AUTO: 29.8 PG (ref 26–34)
MCHC RBC AUTO-ENTMCNC: 31.1 G/DL (ref 31–37)
MCV RBC AUTO: 95.7 FL
MONOCYTES # BLD AUTO: 0.48 X10(3) UL (ref 0.1–1)
MONOCYTES NFR BLD AUTO: 8.7 %
NEUTROPHILS # BLD AUTO: 3.12 X10 (3) UL (ref 1.5–7.7)
NEUTROPHILS # BLD AUTO: 3.12 X10(3) UL (ref 1.5–7.7)
NEUTROPHILS NFR BLD AUTO: 56.9 %
NONHDLC SERPL-MCNC: 165 MG/DL (ref ?–130)
OSMOLALITY SERPL CALC.SUM OF ELEC: 291 MOSM/KG (ref 275–295)
PLATELET # BLD AUTO: 439 10(3)UL (ref 150–450)
POTASSIUM SERPL-SCNC: 4.4 MMOL/L (ref 3.5–5.1)
PROT SERPL-MCNC: 6.7 G/DL (ref 6.4–8.2)
RBC # BLD AUTO: 4.84 X10(6)UL
SODIUM SERPL-SCNC: 140 MMOL/L (ref 136–145)
T4 FREE SERPL-MCNC: 1.2 NG/DL (ref 0.8–1.7)
TSI SER-ACNC: 2.59 MIU/ML (ref 0.36–3.74)
VIT B12 SERPL-MCNC: 427 PG/ML (ref 193–986)
VLDLC SERPL CALC-MCNC: 35 MG/DL (ref 0–30)
WBC # BLD AUTO: 5.5 X10(3) UL (ref 4–11)

## 2022-03-29 PROCEDURE — 80053 COMPREHEN METABOLIC PANEL: CPT

## 2022-03-29 PROCEDURE — 82607 VITAMIN B-12: CPT

## 2022-03-29 PROCEDURE — 80061 LIPID PANEL: CPT

## 2022-03-29 PROCEDURE — 84443 ASSAY THYROID STIM HORMONE: CPT

## 2022-03-29 PROCEDURE — 83735 ASSAY OF MAGNESIUM: CPT

## 2022-03-29 PROCEDURE — 82306 VITAMIN D 25 HYDROXY: CPT

## 2022-03-29 PROCEDURE — 85025 COMPLETE CBC W/AUTO DIFF WBC: CPT

## 2022-03-29 PROCEDURE — 36415 COLL VENOUS BLD VENIPUNCTURE: CPT

## 2022-03-29 PROCEDURE — 84439 ASSAY OF FREE THYROXINE: CPT

## 2022-04-08 ENCOUNTER — OFFICE VISIT (OUTPATIENT)
Dept: FAMILY MEDICINE CLINIC | Facility: CLINIC | Age: 77
End: 2022-04-08
Payer: MEDICARE

## 2022-04-08 VITALS
HEIGHT: 61.22 IN | BODY MASS INDEX: 25.11 KG/M2 | WEIGHT: 133 LBS | OXYGEN SATURATION: 98 % | RESPIRATION RATE: 16 BRPM | HEART RATE: 74 BPM | TEMPERATURE: 97 F | DIASTOLIC BLOOD PRESSURE: 84 MMHG | SYSTOLIC BLOOD PRESSURE: 144 MMHG

## 2022-04-08 DIAGNOSIS — H66.93 ACUTE OTITIS MEDIA, BILATERAL: Primary | ICD-10-CM

## 2022-04-08 PROCEDURE — 99213 OFFICE O/P EST LOW 20 MIN: CPT | Performed by: PHYSICIAN ASSISTANT

## 2022-04-08 RX ORDER — CEFDINIR 300 MG/1
300 CAPSULE ORAL 2 TIMES DAILY
Qty: 20 CAPSULE | Refills: 0 | Status: SHIPPED | OUTPATIENT
Start: 2022-04-08 | End: 2022-04-18

## 2022-04-19 ENCOUNTER — HOSPITAL ENCOUNTER (OUTPATIENT)
Dept: MAMMOGRAPHY | Age: 77
Discharge: HOME | End: 2022-04-19
Attending: FAMILY MEDICINE
Payer: MEDICARE

## 2022-04-19 DIAGNOSIS — R92.8 ABNORMAL MAMMOGRAM: ICD-10-CM

## 2022-04-19 PROCEDURE — 77062 BREAST TOMOSYNTHESIS BI: CPT | Performed by: FAMILY MEDICINE

## 2022-04-19 PROCEDURE — 77066 DX MAMMO INCL CAD BI: CPT | Performed by: FAMILY MEDICINE

## 2022-04-21 ENCOUNTER — TELEPHONE (OUTPATIENT)
Dept: FAMILY MEDICINE CLINIC | Facility: CLINIC | Age: 77
End: 2022-04-21

## 2022-04-21 NOTE — TELEPHONE ENCOUNTER
SPOKE WITH PATIENT FOR UPDATE:   SHE STATES CAN TAKE 2 IBUPROFEN AND IT HELPS , BUT PAIN CAN BE INTENSE WHEN IT COMES AND GOES. CONFIRMED INFORMATION FROM OV ON 4/8/22:  No discharge  No tinnitus  No dizziness  She has frequent throat clearing and chronic PND- PATIENT STATES THIS IS 'JUST HER' SHE ALWAYS HAS THIS  No fever or chills  No cough or breathing symptoms  Ibuprofen helping    Dr. Barbie Corral PA-C out of office, please see patient update.

## 2022-04-21 NOTE — TELEPHONE ENCOUNTER
Treated with amoxicillin for 10 days. She is treated for hypertension with amlodipine. Have her take Sudafed for the next 72 hours. Warn her blood pressure may go up while on this medication. Try to pop your ears like she would if she is on an airplane.

## 2022-04-21 NOTE — TELEPHONE ENCOUNTER
Patient notified, verbalized understanding, she will ask pharmacist to ensure she picks the correct one and she will update your office PRN.

## 2022-04-21 NOTE — TELEPHONE ENCOUNTER
Pt saw Zenobia Montalvo last week for ear pain.  She finished the antibiotics and is still having pain     Please advise

## 2022-07-06 DIAGNOSIS — K21.9 GASTROESOPHAGEAL REFLUX DISEASE WITHOUT ESOPHAGITIS: ICD-10-CM

## 2022-07-06 RX ORDER — OMEPRAZOLE 20 MG/1
CAPSULE, DELAYED RELEASE ORAL
Qty: 90 CAPSULE | Refills: 0 | Status: SHIPPED | OUTPATIENT
Start: 2022-07-06

## 2022-10-08 DIAGNOSIS — K21.9 GASTROESOPHAGEAL REFLUX DISEASE WITHOUT ESOPHAGITIS: ICD-10-CM

## 2022-10-10 RX ORDER — OMEPRAZOLE 20 MG/1
CAPSULE, DELAYED RELEASE ORAL
Qty: 90 CAPSULE | Refills: 0 | Status: SHIPPED | OUTPATIENT
Start: 2022-10-10

## 2022-10-28 ENCOUNTER — HOSPITAL ENCOUNTER (OUTPATIENT)
Facility: HOSPITAL | Age: 77
Setting detail: OBSERVATION
Discharge: HOME | End: 2022-10-29
Attending: EMERGENCY MEDICINE | Admitting: HOSPITALIST
Payer: MEDICARE

## 2022-10-28 ENCOUNTER — APPOINTMENT (OUTPATIENT)
Dept: CT IMAGING | Facility: HOSPITAL | Age: 77
End: 2022-10-28
Attending: EMERGENCY MEDICINE
Payer: MEDICARE

## 2022-10-28 DIAGNOSIS — I74.5 ILIAC ARTERY OCCLUSION (HCC): ICD-10-CM

## 2022-10-28 DIAGNOSIS — R07.9 ACUTE CHEST PAIN: Primary | ICD-10-CM

## 2022-10-28 LAB
ALBUMIN SERPL-MCNC: 4.2 G/DL (ref 3.4–5)
ALBUMIN/GLOB SERPL: 1.1 {RATIO} (ref 1–2)
ALP LIVER SERPL-CCNC: 86 U/L
ALT SERPL-CCNC: 22 U/L
ANION GAP SERPL CALC-SCNC: 5 MMOL/L (ref 0–18)
AST SERPL-CCNC: 13 U/L (ref 15–37)
BASOPHILS # BLD AUTO: 0.03 X10(3) UL (ref 0–0.2)
BASOPHILS NFR BLD AUTO: 0.4 %
BILIRUB SERPL-MCNC: 0.3 MG/DL (ref 0.1–2)
BILIRUB UR QL STRIP.AUTO: NEGATIVE
BUN BLD-MCNC: 14 MG/DL (ref 7–18)
CALCIUM BLD-MCNC: 9.8 MG/DL (ref 8.5–10.1)
CHLORIDE SERPL-SCNC: 108 MMOL/L (ref 98–112)
CLARITY UR REFRACT.AUTO: CLEAR
CO2 SERPL-SCNC: 24 MMOL/L (ref 21–32)
CREAT BLD-MCNC: 0.62 MG/DL
EOSINOPHIL # BLD AUTO: 0.15 X10(3) UL (ref 0–0.7)
EOSINOPHIL NFR BLD AUTO: 1.8 %
ERYTHROCYTE [DISTWIDTH] IN BLOOD BY AUTOMATED COUNT: 13.3 %
GFR SERPLBLD BASED ON 1.73 SQ M-ARVRAT: 91 ML/MIN/1.73M2 (ref 60–?)
GLOBULIN PLAS-MCNC: 3.8 G/DL (ref 2.8–4.4)
GLUCOSE BLD-MCNC: 121 MG/DL (ref 70–99)
GLUCOSE UR STRIP.AUTO-MCNC: NEGATIVE MG/DL
HCT VFR BLD AUTO: 48 %
HGB BLD-MCNC: 16 G/DL
IMM GRANULOCYTES # BLD AUTO: 0.04 X10(3) UL (ref 0–1)
IMM GRANULOCYTES NFR BLD: 0.5 %
INR BLD: 0.93 (ref 0.85–1.16)
LIPASE SERPL-CCNC: 83 U/L (ref 73–393)
LYMPHOCYTES # BLD AUTO: 1.97 X10(3) UL (ref 1–4)
LYMPHOCYTES NFR BLD AUTO: 23.4 %
MCH RBC QN AUTO: 31 PG (ref 26–34)
MCHC RBC AUTO-ENTMCNC: 33.3 G/DL (ref 31–37)
MCV RBC AUTO: 93 FL
MONOCYTES # BLD AUTO: 0.64 X10(3) UL (ref 0.1–1)
MONOCYTES NFR BLD AUTO: 7.6 %
NEUTROPHILS # BLD AUTO: 5.58 X10 (3) UL (ref 1.5–7.7)
NEUTROPHILS # BLD AUTO: 5.58 X10(3) UL (ref 1.5–7.7)
NEUTROPHILS NFR BLD AUTO: 66.3 %
NITRITE UR QL STRIP.AUTO: NEGATIVE
OSMOLALITY SERPL CALC.SUM OF ELEC: 286 MOSM/KG (ref 275–295)
PH UR STRIP.AUTO: 6 [PH] (ref 5–8)
PLATELET # BLD AUTO: 431 10(3)UL (ref 150–450)
POTASSIUM SERPL-SCNC: 3.5 MMOL/L (ref 3.5–5.1)
PROT SERPL-MCNC: 8 G/DL (ref 6.4–8.2)
PROT UR STRIP.AUTO-MCNC: NEGATIVE MG/DL
PROTHROMBIN TIME: 12.5 SECONDS (ref 11.6–14.8)
RBC # BLD AUTO: 5.16 X10(6)UL
RBC UR QL AUTO: NEGATIVE
SARS-COV-2 RNA RESP QL NAA+PROBE: NOT DETECTED
SODIUM SERPL-SCNC: 137 MMOL/L (ref 136–145)
SP GR UR STRIP.AUTO: >1.03 (ref 1–1.03)
TROPONIN I HIGH SENSITIVITY: 8 NG/L
UROBILINOGEN UR STRIP.AUTO-MCNC: <2 MG/DL
WBC # BLD AUTO: 8.4 X10(3) UL (ref 4–11)

## 2022-10-28 PROCEDURE — 99220 INITIAL OBSERVATION CARE,LEVL III: CPT | Performed by: HOSPITALIST

## 2022-10-28 PROCEDURE — 71275 CT ANGIOGRAPHY CHEST: CPT | Performed by: EMERGENCY MEDICINE

## 2022-10-28 PROCEDURE — 74174 CTA ABD&PLVS W/CONTRAST: CPT | Performed by: EMERGENCY MEDICINE

## 2022-10-28 RX ORDER — ASPIRIN 81 MG/1
81 TABLET, CHEWABLE ORAL DAILY
Status: DISCONTINUED | OUTPATIENT
Start: 2022-10-28 | End: 2022-10-29

## 2022-10-28 RX ORDER — LABETALOL HYDROCHLORIDE 5 MG/ML
20 INJECTION, SOLUTION INTRAVENOUS ONCE
Status: DISCONTINUED | OUTPATIENT
Start: 2022-10-28 | End: 2022-10-29

## 2022-10-28 RX ORDER — POTASSIUM CHLORIDE 20 MEQ/1
40 TABLET, EXTENDED RELEASE ORAL EVERY 4 HOURS
Status: COMPLETED | OUTPATIENT
Start: 2022-10-28 | End: 2022-10-29

## 2022-10-28 RX ORDER — AMLODIPINE BESYLATE AND BENAZEPRIL HYDROCHLORIDE 5; 10 MG/1; MG/1
1 CAPSULE ORAL DAILY
Status: DISCONTINUED | OUTPATIENT
Start: 2022-10-28 | End: 2022-10-28 | Stop reason: RX

## 2022-10-28 RX ORDER — OXYBUTYNIN CHLORIDE 5 MG/1
5 TABLET, EXTENDED RELEASE ORAL DAILY
Status: DISCONTINUED | OUTPATIENT
Start: 2022-10-28 | End: 2022-10-29

## 2022-10-28 RX ORDER — IOHEXOL 350 MG/ML
100 INJECTION, SOLUTION INTRAVENOUS
Status: COMPLETED | OUTPATIENT
Start: 2022-10-28 | End: 2022-10-28

## 2022-10-28 RX ORDER — ACETAMINOPHEN 500 MG
500 TABLET ORAL EVERY 4 HOURS PRN
Status: DISCONTINUED | OUTPATIENT
Start: 2022-10-28 | End: 2022-10-29

## 2022-10-28 RX ORDER — LABETALOL HYDROCHLORIDE 5 MG/ML
20 INJECTION, SOLUTION INTRAVENOUS ONCE
Status: COMPLETED | OUTPATIENT
Start: 2022-10-28 | End: 2022-10-28

## 2022-10-28 RX ORDER — PANTOPRAZOLE SODIUM 20 MG/1
20 TABLET, DELAYED RELEASE ORAL
Status: DISCONTINUED | OUTPATIENT
Start: 2022-10-29 | End: 2022-10-29

## 2022-10-28 RX ORDER — ASPIRIN 81 MG/1
324 TABLET, CHEWABLE ORAL ONCE
Status: COMPLETED | OUTPATIENT
Start: 2022-10-28 | End: 2022-10-28

## 2022-10-28 RX ORDER — ENOXAPARIN SODIUM 100 MG/ML
40 INJECTION SUBCUTANEOUS DAILY
Status: DISCONTINUED | OUTPATIENT
Start: 2022-10-29 | End: 2022-10-29

## 2022-10-28 RX ORDER — ATORVASTATIN CALCIUM 10 MG/1
10 TABLET, FILM COATED ORAL NIGHTLY
Status: DISCONTINUED | OUTPATIENT
Start: 2022-10-28 | End: 2022-10-29

## 2022-10-28 RX ORDER — ONDANSETRON 2 MG/ML
4 INJECTION INTRAMUSCULAR; INTRAVENOUS EVERY 6 HOURS PRN
Status: DISCONTINUED | OUTPATIENT
Start: 2022-10-28 | End: 2022-10-29

## 2022-10-28 RX ORDER — METOCLOPRAMIDE HYDROCHLORIDE 5 MG/ML
10 INJECTION INTRAMUSCULAR; INTRAVENOUS EVERY 8 HOURS PRN
Status: DISCONTINUED | OUTPATIENT
Start: 2022-10-28 | End: 2022-10-29

## 2022-10-28 NOTE — ED PROVIDER NOTES
Patient Seen in: [facility] Emergency Department      History   Patient presents with:  Abdomen/Flank Pain  HTN    Stated Complaint:     Subjective:   HPI    Patient is 49-year-old female presents with lower abdominal pain started about 3 days ago. Has been relatively constant today is radiating up into her chest on her left arm. Denies any shortness of breath. No reports of any nausea vomiting diaphoresis or syncope. No fever chills cough hemoptysis or any constitutional symptoms. She takes amlodipine for hypertension takes it at night presented to immediate care today where she was acutely hypertensive. EKG was done and she was sent to the ED for evaluation. No other associated symptoms. No alleviating aggravating factors. No other complaints    Objective:   No pertinent past medical history. No pertinent past surgical history. No pertinent social history. Review of Systems    Positive for stated complaint:   Other systems are as noted in HPI. Constitutional and vital signs reviewed. All other systems reviewed and negative except as noted above. Physical Exam     ED Triage Vitals [10/28/22 1749]   BP (!) 214/111   Pulse 113   Resp 18   Temp    Temp src    SpO2 98 %   O2 Device None (Room air)       Current:BP (!) 167/80   Pulse 79   Resp 15   SpO2 94%         Physical Exam  Vitals and nursing note reviewed. Constitutional:       General: She is not in acute distress. Appearance: She is well-developed. She is not toxic-appearing. HENT:      Head: Normocephalic and atraumatic. Eyes:      General: No scleral icterus. Conjunctiva/sclera: Conjunctivae normal.   Cardiovascular:      Rate and Rhythm: Normal rate and regular rhythm. Heart sounds: No murmur heard. Pulmonary:      Effort: Pulmonary effort is normal. No respiratory distress. Abdominal:      General: There is no distension. Palpations: Abdomen is soft.       Tenderness: There is no abdominal tenderness. Musculoskeletal:         General: No tenderness. Normal range of motion. Cervical back: Normal range of motion and neck supple. Skin:     General: Skin is warm and dry. Findings: No rash. Neurological:      General: No focal deficit present. Mental Status: She is alert and oriented to person, place, and time. Motor: No abnormal muscle tone. Coordination: Coordination normal.   Psychiatric:         Mood and Affect: Mood normal.         Behavior: Behavior normal.              ED Course     Labs Reviewed   COMP METABOLIC PANEL (14) - Abnormal; Notable for the following components:       Result Value    Glucose 121 (*)     AST 13 (*)     All other components within normal limits   URINALYSIS WITH CULTURE REFLEX - Abnormal; Notable for the following components:    Spec Gravity >1.030 (*)     Ketones Urine Trace (*)     Leukocyte Esterase Urine Large (*)     WBC Urine 11-20 (*)     Bacteria Urine Rare (*)     Squamous Epi. Cells Few (*)     All other components within normal limits   TROPONIN I HIGH SENSITIVITY - Normal   PROTHROMBIN TIME (PT) - Normal   LIPASE - Normal   RAPID SARS-COV-2 BY PCR - Normal   CBC WITH DIFFERENTIAL WITH PLATELET    Narrative: The following orders were created for panel order CBC With Differential With Platelet. Procedure                               Abnormality         Status                     ---------                               -----------         ------                     CBC W/ DIFFERENTIAL[[id number]]                              Final result                 Please view results for these tests on the individual orders.    RAINBOW DRAW LAVENDER   RAINBOW DRAW LIGHT GREEN   RAINBOW DRAW BLUE   URINE CULTURE, ROUTINE   CBC W/ DIFFERENTIAL                    MDM      Labs Reviewed   COMP METABOLIC PANEL (14) - Abnormal; Notable for the following components:       Result Value    Glucose 121 (*)     AST 13 (*)     All other components within normal limits   URINALYSIS WITH CULTURE REFLEX - Abnormal; Notable for the following components:    Spec Gravity >1.030 (*)     Ketones Urine Trace (*)     Leukocyte Esterase Urine Large (*)     WBC Urine 11-20 (*)     Bacteria Urine Rare (*)     Squamous Epi. Cells Few (*)     All other components within normal limits   TROPONIN I HIGH SENSITIVITY - Normal   PROTHROMBIN TIME (PT) - Normal   LIPASE - Normal   RAPID SARS-COV-2 BY PCR - Normal   CBC WITH DIFFERENTIAL WITH PLATELET    Narrative: The following orders were created for panel order CBC With Differential With Platelet. Procedure                               Abnormality         Status                     ---------                               -----------         ------                     CBC W/ DIFFERENTIAL[[id number]]                              Final result                 Please view results for these tests on the individual orders. RAINBOW DRAW LAVENDER   RAINBOW DRAW LIGHT GREEN   RAINBOW DRAW BLUE   URINE CULTURE, ROUTINE   CBC W/ DIFFERENTIAL     CTA CHEST CTA ABDOMEN CTA PELVIS (CPT=71275/46870)   Final Result    PROCEDURE:  CTA CHEST CTA ABDOMEN CTA PELVIS (CPT=71275/14220)         LOCATION:  [facility]           COMPARISON:  None. INDICATIONS:  Chest and abdominal pain with hypertension. Clinical     concern for potential aortic dissection. TECHNIQUE:  CT (with CTA imaging) of the chest, abdomen, and pelvis were     obtained. Multi-planar reformatted/3-D images were created to optimize     visualization of vascular anatomy. Dose reduction techniques were used. Dose information is transmitted to     the ACR [facility] [employer] [facility] [employer]) NRDR ([address] ) which includes the Dose Index Registry. PATIENT STATED HISTORY:(As transcribed by Technologist)  Lower abdominal     pain and hypertension that began earlier today.  History of occlusion and     stenosis of carotid artery, hysterectomy, . CONTRAST USED:  100cc of Omnipaque 350         FINDINGS:           CHEST:      LUNGS:  No acute airspace disease noted. There is a 6 mm pulmonary nodule     within the posterolateral aspect of the left lung base best seen on image     209 of series 6. No additional pulmonary opacities/nodules are noted. MEDIASTINUM:  There is a large hiatal hernia containing the stomach,     duodenal bulb and a segment of the transverse colon measuring     approximately 11.9 x 11.1 x 16.5 cm in craniocaudal, AP and transverse     dimensions respectively. JOEL:  No mass or adenopathy. CARDIAC:  No cardiac enlargement. There are atherosclerotic     calcifications suggested along the left anterior descending coronary     artery. PLEURA:  No mass or effusion. CHEST WALL:  No mass or axillary adenopathy. AORTA:  No aneurysm or dissection. There are atherosclerotic     calcifications of the thoracic aorta to a mild degree. VASCULATURE:  No visible pulmonary arterial thrombus or attenuation. ABDOMEN/PELVIS:    LIVER:  No enlargement, atrophy, abnormal density, or significant focal     lesion. BILIARY:  No visible dilatation or calcification. PANCREAS:  No lesion, fluid collection, ductal dilatation, or atrophy. SPLEEN:  No enlargement or focal lesion. KIDNEYS:  There is evidence of a simple nonenhancing cyst involving the     inferior pole the left kidney measuring 2.1 cm. No additional renal     lesions are noted. ADRENALS:  No mass or enlargement. AORTA:  No aneurysm or dissection. There are mild to moderate     atherosclerotic calcifications of the abdominal aorta and iliac vessels. There is evidence of a focus of occlusion involving the right common iliac     artery due to a combination of soft and     calcified plaque. The occlusion spans for approximately 1.4 cm.   The     right distal common iliac artery and right internal external iliac     arteries are patent. RETROPERITONEUM:  No mass or adenopathy. BOWEL/MESENTERY:  The stomach, duodenum sweep and small bowel reveal no     acute process. There is a large hiatal hernia containing the stomach ,     duodenum sweep and a portion of the transverse colon as described above. There is evidence of diverticulosis     of the descending and sigmoid colon. No acute diverticulitis or colitis     is identified. ABDOMINAL WALL:  No mass or hernia. URINARY BLADDER:  No visible focal wall thickening, lesion, or calculus. PELVIC NODES:  No adenopathy. PELVIC ORGANS:  There is a vaginal pessary noted in place. BONES:  No bony lesion or fracture.                                 =====    CONCLUSION:      1. 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 The distal right common iliac artery, right internal external iliac     arteries are patent. 2. Atherosclerotic calcifications of the abdominal aorta and iliac     vessels. No aneurysmal dilatation of the abdominal aorta. No dissection     of the abdominal aorta. 3. Mild atherosclerotic plaque of the thoracic aorta without evidence of     ectasia, aneurysm or dissection. 4. There is a large hiatal hernia that contains the stomach, the duodenal     bulb and a portion of the transverse colon. No acute process regarding     this hernia noted. 5. Uncomplicated diverticulosis of the descending and sigmoid colon. 6. Please see the body of the report above for further, less significant     details.               Dictated by (CST): Gareth Crawford DO on 10/28/2022 at 7:10 PM         Finalized by (CST): Gareth Crawford DO on 10/28/2022 at 7:20 PM            Medications   labetalol (Trandate) 5 mg/mL injection 20 mg (20 mg Intravenous Given 10/28/22 1756)   iohexol (Omnipaque) 350 MG/ML injection via power injector 100 mL (100 mL Intravenous Given 10/28/22 1845)   aspirin chewable tab 324 mg (324 mg Oral Given 10/28/22 1944)         Discussed with Dr. Tyler Ochoa -iliac findings are likely chronic. Recommend patient be on aspirin and high-dose statin. No heparin. Patient extremities warm well perfused without any apparent claudication complaints        Was given labetalol with improvement of blood pressure and heart rate. EKG and troponin testing do not show any pattern of cardiac injury at this time. In light of her symptoms of chest pain going on left arm I will admit her for further care to the hospitalist service. Patient stable for the floor. Admission disposition: 10/28/2022  8:53 PM                                        Disposition and Plan     Clinical Impression:  Acute chest pain  (primary encounter diagnosis)  Iliac artery occlusion ([facility], [address]     Disposition:  Admit  10/28/2022  8:53 pm    Follow-up:  No follow-up provider specified.         Medications Prescribed:  Current Discharge Medication List                          Hospital Problems     Present on Admission  Date Reviewed: 4/8/2022          ICD-10-CM Noted POA    * (Principal) Acute chest pain R07.9 10/28/2022 Unknown

## 2022-10-28 NOTE — ED INITIAL ASSESSMENT (HPI)
PT TO THE ED VIA EMS FROM IC FOR LOWER ABD PAIN AND HTN. PTS BP FOR EMS /104. PT DENIES HA, DIZZINESS, TINGLING.  PT USUALLY TAKES AMLODIPINE

## 2022-10-29 ENCOUNTER — APPOINTMENT (OUTPATIENT)
Dept: CV DIAGNOSTICS | Facility: HOSPITAL | Age: 77
End: 2022-10-29
Attending: NURSE PRACTITIONER
Payer: MEDICARE

## 2022-10-29 VITALS
HEART RATE: 72 BPM | BODY MASS INDEX: 25 KG/M2 | RESPIRATION RATE: 18 BRPM | TEMPERATURE: 99 F | OXYGEN SATURATION: 96 % | SYSTOLIC BLOOD PRESSURE: 135 MMHG | WEIGHT: 132.5 LBS | DIASTOLIC BLOOD PRESSURE: 71 MMHG

## 2022-10-29 LAB
ATRIAL RATE: 97 BPM
CHOLEST SERPL-MCNC: 200 MG/DL (ref ?–200)
HDLC SERPL-MCNC: 43 MG/DL (ref 40–59)
LDLC SERPL CALC-MCNC: 124 MG/DL (ref ?–100)
NONHDLC SERPL-MCNC: 157 MG/DL (ref ?–130)
P AXIS: 72 DEGREES
P-R INTERVAL: 146 MS
POTASSIUM SERPL-SCNC: 4 MMOL/L (ref 3.5–5.1)
Q-T INTERVAL: 356 MS
QRS DURATION: 66 MS
QTC CALCULATION (BEZET): 452 MS
R AXIS: 26 DEGREES
T AXIS: 67 DEGREES
TRIGL SERPL-MCNC: 185 MG/DL (ref 30–149)
TROPONIN I HIGH SENSITIVITY: 9 NG/L
VENTRICULAR RATE: 97 BPM
VLDLC SERPL CALC-MCNC: 33 MG/DL (ref 0–30)

## 2022-10-29 PROCEDURE — 99497 ADVNCD CARE PLAN 30 MIN: CPT | Performed by: HOSPITALIST

## 2022-10-29 PROCEDURE — 93306 TTE W/DOPPLER COMPLETE: CPT | Performed by: NURSE PRACTITIONER

## 2022-10-29 PROCEDURE — 99217 OBSERVATION CARE DISCHARGE: CPT | Performed by: HOSPITALIST

## 2022-10-29 RX ORDER — NITROFURANTOIN 25; 75 MG/1; MG/1
100 CAPSULE ORAL 2 TIMES DAILY
Qty: 10 CAPSULE | Refills: 0 | Status: SHIPPED | OUTPATIENT
Start: 2022-10-29 | End: 2022-11-03

## 2022-10-29 RX ORDER — ATORVASTATIN CALCIUM 40 MG/1
40 TABLET, FILM COATED ORAL NIGHTLY
Qty: 30 TABLET | Refills: 1 | Status: SHIPPED | OUTPATIENT
Start: 2022-10-29

## 2022-10-29 NOTE — PLAN OF CARE
Assumed care of patient at 0730. Alert and oriented x4, No C/O chest pain or SOB, SPO2 on RA 96%, Heart rate SR 70s-80s. Breath sounds clear. Bed is locked and in low position. Personal belonging within reach. Dr. Rosalba Reece at bedside with orders. Updated on plan of care and verbalized understanding. Will continue to monitor. 2D echo completed. Repeat troponin (-). Discharge orders received. Paged JASPREET Boland, ok to discharge from cardiology. Vascular surgery S/O.      Problem: Patient/Family Goals  Goal: Patient/Family Long Term Goal  Description: Patient's Long Term Goal: Discharge to home     Interventions:  - cardiology consult and follow orders , CT chest / Abd/ pelvis , 2 d echo ,EKG , Cxray monitor BP and management , start aspirin and statin , Vascular  surgery consult for RT ileac occlusion ,monitor rhythm ,rate ,labs , safety, discuss plan of care        - See additional Care Plan goals for specific interventions  Outcome: Progressing  Goal: Patient/Family Short Term Goal  Description: Patient's Short Term Goal: pain mgt , safety precaution    Interventions:   - offer and assist  activities, pain mgt ,update plan of care , fall prevention, monitor v/s,   - See additional Care Plan goals for specific interventions  Outcome: Progressing

## 2022-10-29 NOTE — ED QUICK NOTES
Orders for admission, patient is aware of plan and ready to go upstairs.  Any questions, please call ED RN karla at extension [phone]    Patient Covid vaccination status: Fully vaccinated     COVID Test Ordered in ED: Rapid SARS-CoV-2 by PCR    COVID Suspicion at Admission: Low clinical suspicion for COVID WAS NEG     Running Infusions:  None    Mental Status/LOC at time of transport: A&OX3    Other pertinent information:   CIWA score: N/A   NIH score:  N/A

## 2022-10-29 NOTE — DISCHARGE PLANNING
NURSING DISCHARGE NOTE    Discharged Home via Wheelchair. Accompanied by Spouse  Belongings Taken by patient/family. IV removed, tele leads and box removed. Patient given discharge instructions. Patients personal items taken by spouse. Discharged via wheelchair to [place] parking lot.

## 2022-10-31 ENCOUNTER — PATIENT OUTREACH (OUTPATIENT)
Dept: CASE MANAGEMENT | Age: 77
End: 2022-10-31

## 2022-10-31 ENCOUNTER — OFFICE VISIT (OUTPATIENT)
Dept: FAMILY MEDICINE CLINIC | Facility: CLINIC | Age: 77
End: 2022-10-31
Payer: MEDICARE

## 2022-10-31 VITALS
DIASTOLIC BLOOD PRESSURE: 76 MMHG | BODY MASS INDEX: 24.79 KG/M2 | WEIGHT: 133 LBS | OXYGEN SATURATION: 95 % | HEART RATE: 78 BPM | HEIGHT: 61.5 IN | SYSTOLIC BLOOD PRESSURE: 134 MMHG | RESPIRATION RATE: 16 BRPM

## 2022-10-31 DIAGNOSIS — Z02.9 ENCOUNTERS FOR UNSPECIFIED ADMINISTRATIVE PURPOSE: ICD-10-CM

## 2022-10-31 DIAGNOSIS — I73.9 PVD (PERIPHERAL VASCULAR DISEASE) (HCC): ICD-10-CM

## 2022-10-31 DIAGNOSIS — K59.09 CHRONIC CONSTIPATION: ICD-10-CM

## 2022-10-31 DIAGNOSIS — I10 ESSENTIAL HYPERTENSION: ICD-10-CM

## 2022-10-31 DIAGNOSIS — R07.9 CHEST PAIN, UNSPECIFIED TYPE: Primary | ICD-10-CM

## 2022-10-31 DIAGNOSIS — I73.9 CLAUDICATION (HCC): ICD-10-CM

## 2022-10-31 DIAGNOSIS — K44.9 HIATAL HERNIA: ICD-10-CM

## 2022-10-31 PROCEDURE — 99496 TRANSJ CARE MGMT HIGH F2F 7D: CPT | Performed by: FAMILY MEDICINE

## 2022-10-31 PROCEDURE — 1111F DSCHRG MED/CURRENT MED MERGE: CPT

## 2022-10-31 PROCEDURE — 1111F DSCHRG MED/CURRENT MED MERGE: CPT | Performed by: FAMILY MEDICINE

## 2022-10-31 RX ORDER — BROMFENAC SODIUM 0.7 MG/ML
SOLUTION/ DROPS OPHTHALMIC
COMMUNITY
Start: 2022-10-03

## 2022-10-31 RX ORDER — HYDRALAZINE HYDROCHLORIDE 10 MG/1
10 TABLET, FILM COATED ORAL 4 TIMES DAILY PRN
Qty: 90 TABLET | Refills: 0 | Status: SHIPPED | OUTPATIENT
Start: 2022-10-31

## 2022-11-08 ENCOUNTER — HOSPITAL ENCOUNTER (OUTPATIENT)
Dept: ULTRASOUND IMAGING | Facility: HOSPITAL | Age: 77
Discharge: HOME | End: 2022-11-08
Attending: FAMILY MEDICINE
Payer: MEDICARE

## 2022-11-08 ENCOUNTER — HOSPITAL ENCOUNTER (OUTPATIENT)
Dept: CV DIAGNOSTICS | Facility: HOSPITAL | Age: 77
End: 2022-11-08
Attending: FAMILY MEDICINE
Payer: MEDICARE

## 2022-11-08 DIAGNOSIS — I73.9 CLAUDICATION (HCC): ICD-10-CM

## 2022-11-08 PROCEDURE — 93925 LOWER EXTREMITY STUDY: CPT | Performed by: FAMILY MEDICINE

## 2022-11-11 ENCOUNTER — TELEPHONE (OUTPATIENT)
Dept: FAMILY MEDICINE CLINIC | Facility: CLINIC | Age: 77
End: 2022-11-11

## 2022-11-11 NOTE — TELEPHONE ENCOUNTER
DEV spoke to pt she wants Dr Jaime Antonio to know her Delorise Hector was cancelled on 11/15/22 due to Veljolie Rebolledo being out of medication used. She has been rescheduled for 11/21/22.

## 2022-11-11 NOTE — TELEPHONE ENCOUNTER
Patient had stress test scheduled but they had to cancel and reschedule her because they did not have enough serum and they are not sure when they will get it in. Can Dinora Yost go to another hospital to have this done? She will call them if we can give her the coding info she will need to check on it.

## 2022-11-18 ENCOUNTER — OFFICE VISIT (OUTPATIENT)
Dept: FAMILY MEDICINE CLINIC | Facility: CLINIC | Age: 77
End: 2022-11-18
Payer: MEDICARE

## 2022-11-18 VITALS
HEIGHT: 61.5 IN | DIASTOLIC BLOOD PRESSURE: 72 MMHG | BODY MASS INDEX: 24.79 KG/M2 | RESPIRATION RATE: 16 BRPM | SYSTOLIC BLOOD PRESSURE: 124 MMHG | HEART RATE: 85 BPM | OXYGEN SATURATION: 97 % | WEIGHT: 133 LBS

## 2022-11-18 DIAGNOSIS — I10 ESSENTIAL HYPERTENSION: ICD-10-CM

## 2022-11-18 DIAGNOSIS — R07.9 CHEST PAIN, UNSPECIFIED TYPE: Primary | ICD-10-CM

## 2022-11-18 DIAGNOSIS — I73.9 CLAUDICATION (HCC): ICD-10-CM

## 2022-11-18 DIAGNOSIS — I73.9 PVD (PERIPHERAL VASCULAR DISEASE) (HCC): ICD-10-CM

## 2022-11-18 DIAGNOSIS — K59.09 CHRONIC CONSTIPATION: ICD-10-CM

## 2022-11-18 PROCEDURE — 1111F DSCHRG MED/CURRENT MED MERGE: CPT | Performed by: FAMILY MEDICINE

## 2022-11-18 PROCEDURE — 99214 OFFICE O/P EST MOD 30 MIN: CPT | Performed by: FAMILY MEDICINE

## 2022-11-21 ENCOUNTER — HOSPITAL ENCOUNTER (OUTPATIENT)
Dept: CV DIAGNOSTICS | Age: 77
Discharge: HOME | End: 2022-11-21
Attending: FAMILY MEDICINE
Payer: MEDICARE

## 2022-11-21 DIAGNOSIS — R07.9 CHEST PAIN, UNSPECIFIED TYPE: ICD-10-CM

## 2022-11-21 PROCEDURE — 93017 CV STRESS TEST TRACING ONLY: CPT | Performed by: FAMILY MEDICINE

## 2022-11-21 PROCEDURE — 78452 HT MUSCLE IMAGE SPECT MULT: CPT | Performed by: FAMILY MEDICINE

## 2022-11-23 ENCOUNTER — HOSPITAL ENCOUNTER (EMERGENCY)
Facility: HOSPITAL | Age: 77
Discharge: HOME | End: 2022-11-23
Attending: EMERGENCY MEDICINE
Payer: MEDICARE

## 2022-11-23 VITALS
RESPIRATION RATE: 14 BRPM | OXYGEN SATURATION: 95 % | SYSTOLIC BLOOD PRESSURE: 143 MMHG | WEIGHT: 132.94 LBS | HEART RATE: 81 BPM | TEMPERATURE: 98 F | BODY MASS INDEX: 25 KG/M2 | DIASTOLIC BLOOD PRESSURE: 78 MMHG

## 2022-11-23 DIAGNOSIS — I10 HYPERTENSION, UNSPECIFIED TYPE: Primary | ICD-10-CM

## 2022-11-23 DIAGNOSIS — R42 EPISODE OF DIZZINESS: ICD-10-CM

## 2022-11-23 LAB
ALBUMIN SERPL-MCNC: 4.2 G/DL (ref 3.4–5)
ALBUMIN/GLOB SERPL: 1.2 {RATIO} (ref 1–2)
ALP LIVER SERPL-CCNC: 85 U/L
ALT SERPL-CCNC: 30 U/L
ANION GAP SERPL CALC-SCNC: 1 MMOL/L (ref 0–18)
AST SERPL-CCNC: 15 U/L (ref 15–37)
BASOPHILS # BLD AUTO: 0.03 X10(3) UL (ref 0–0.2)
BASOPHILS NFR BLD AUTO: 0.4 %
BILIRUB SERPL-MCNC: 0.2 MG/DL (ref 0.1–2)
BUN BLD-MCNC: 14 MG/DL (ref 7–18)
CALCIUM BLD-MCNC: 10.4 MG/DL (ref 8.5–10.1)
CHLORIDE SERPL-SCNC: 110 MMOL/L (ref 98–112)
CO2 SERPL-SCNC: 29 MMOL/L (ref 21–32)
CREAT BLD-MCNC: 0.61 MG/DL
EOSINOPHIL # BLD AUTO: 0.32 X10(3) UL (ref 0–0.7)
EOSINOPHIL NFR BLD AUTO: 4.1 %
ERYTHROCYTE [DISTWIDTH] IN BLOOD BY AUTOMATED COUNT: 13.2 %
GFR SERPLBLD BASED ON 1.73 SQ M-ARVRAT: 91 ML/MIN/1.73M2 (ref 60–?)
GLOBULIN PLAS-MCNC: 3.4 G/DL (ref 2.8–4.4)
GLUCOSE BLD-MCNC: 106 MG/DL (ref 70–99)
HCT VFR BLD AUTO: 45.9 %
HGB BLD-MCNC: 15.5 G/DL
IMM GRANULOCYTES # BLD AUTO: 0.03 X10(3) UL (ref 0–1)
IMM GRANULOCYTES NFR BLD: 0.4 %
LYMPHOCYTES # BLD AUTO: 2.28 X10(3) UL (ref 1–4)
LYMPHOCYTES NFR BLD AUTO: 29.1 %
MCH RBC QN AUTO: 30.8 PG (ref 26–34)
MCHC RBC AUTO-ENTMCNC: 33.8 G/DL (ref 31–37)
MCV RBC AUTO: 91.3 FL
MONOCYTES # BLD AUTO: 0.74 X10(3) UL (ref 0.1–1)
MONOCYTES NFR BLD AUTO: 9.5 %
NEUTROPHILS # BLD AUTO: 4.43 X10 (3) UL (ref 1.5–7.7)
NEUTROPHILS # BLD AUTO: 4.43 X10(3) UL (ref 1.5–7.7)
NEUTROPHILS NFR BLD AUTO: 56.5 %
OSMOLALITY SERPL CALC.SUM OF ELEC: 291 MOSM/KG (ref 275–295)
PLATELET # BLD AUTO: 381 10(3)UL (ref 150–450)
POTASSIUM SERPL-SCNC: 3.5 MMOL/L (ref 3.5–5.1)
PROT SERPL-MCNC: 7.6 G/DL (ref 6.4–8.2)
RBC # BLD AUTO: 5.03 X10(6)UL
SODIUM SERPL-SCNC: 140 MMOL/L (ref 136–145)
WBC # BLD AUTO: 7.8 X10(3) UL (ref 4–11)

## 2022-11-23 PROCEDURE — 85025 COMPLETE CBC W/AUTO DIFF WBC: CPT | Performed by: EMERGENCY MEDICINE

## 2022-11-23 PROCEDURE — 85025 COMPLETE CBC W/AUTO DIFF WBC: CPT

## 2022-11-23 PROCEDURE — 80053 COMPREHEN METABOLIC PANEL: CPT | Performed by: EMERGENCY MEDICINE

## 2022-11-23 PROCEDURE — 93010 ELECTROCARDIOGRAM REPORT: CPT

## 2022-11-23 PROCEDURE — 93005 ELECTROCARDIOGRAM TRACING: CPT

## 2022-11-23 PROCEDURE — 80053 COMPREHEN METABOLIC PANEL: CPT

## 2022-11-23 PROCEDURE — 99284 EMERGENCY DEPT VISIT MOD MDM: CPT

## 2022-11-23 PROCEDURE — 36415 COLL VENOUS BLD VENIPUNCTURE: CPT

## 2022-11-23 PROCEDURE — 99285 EMERGENCY DEPT VISIT HI MDM: CPT

## 2022-11-24 NOTE — ED INITIAL ASSESSMENT (HPI)
Pt took her blood pressure at home after feeling a little lightheaded and it was 192/108. Pt took the hydralazine 10mg PO that her MD gave her to take if BP was high at 2000. BP now 175/99.

## 2022-11-25 LAB
ATRIAL RATE: 77 BPM
P AXIS: 47 DEGREES
P-R INTERVAL: 144 MS
Q-T INTERVAL: 380 MS
QRS DURATION: 76 MS
QTC CALCULATION (BEZET): 430 MS
R AXIS: 34 DEGREES
T AXIS: 68 DEGREES
VENTRICULAR RATE: 77 BPM

## 2022-12-05 ENCOUNTER — HOSPITAL ENCOUNTER (OUTPATIENT)
Dept: BONE DENSITY | Age: 77
Discharge: HOME | End: 2022-12-05
Attending: FAMILY MEDICINE
Payer: MEDICARE

## 2022-12-05 DIAGNOSIS — Z78.0 ASYMPTOMATIC MENOPAUSE: ICD-10-CM

## 2022-12-05 DIAGNOSIS — Z13.820 SCREENING FOR OSTEOPOROSIS: ICD-10-CM

## 2022-12-05 PROCEDURE — 77080 DXA BONE DENSITY AXIAL: CPT | Performed by: FAMILY MEDICINE

## 2022-12-22 RX ORDER — ATORVASTATIN CALCIUM 40 MG/1
40 TABLET, FILM COATED ORAL NIGHTLY
Qty: 90 TABLET | Refills: 0 | Status: SHIPPED | OUTPATIENT
Start: 2022-12-22

## 2022-12-22 NOTE — TELEPHONE ENCOUNTER
When pt was in the hospital on October they gave her a statin. Pt needs a refill of atorvastatin  Can Dr. Josephine Sims please refill?  [facility] on [facility]  Pt was seen 11/18/22 conrad ZAMARRIPA

## 2023-01-02 DIAGNOSIS — K21.9 GASTROESOPHAGEAL REFLUX DISEASE WITHOUT ESOPHAGITIS: ICD-10-CM

## 2023-01-03 RX ORDER — OMEPRAZOLE 20 MG/1
CAPSULE, DELAYED RELEASE ORAL
Qty: 90 CAPSULE | Refills: 0 | Status: SHIPPED | OUTPATIENT
Start: 2023-01-03

## 2023-01-22 DIAGNOSIS — I10 ESSENTIAL HYPERTENSION: ICD-10-CM

## 2023-01-23 RX ORDER — AMLODIPINE BESYLATE AND BENAZEPRIL HYDROCHLORIDE 5; 10 MG/1; MG/1
CAPSULE ORAL
Qty: 90 CAPSULE | Refills: 0 | Status: SHIPPED | OUTPATIENT
Start: 2023-01-23

## 2023-02-21 ENCOUNTER — OFFICE VISIT (OUTPATIENT)
Dept: FAMILY MEDICINE CLINIC | Facility: CLINIC | Age: 78
End: 2023-02-21
Payer: MEDICARE

## 2023-02-21 VITALS
BODY MASS INDEX: 24.79 KG/M2 | HEART RATE: 74 BPM | HEIGHT: 61.5 IN | OXYGEN SATURATION: 96 % | RESPIRATION RATE: 16 BRPM | WEIGHT: 133 LBS | SYSTOLIC BLOOD PRESSURE: 124 MMHG | DIASTOLIC BLOOD PRESSURE: 84 MMHG

## 2023-02-21 DIAGNOSIS — M79.675 PAIN OF TOE OF LEFT FOOT: ICD-10-CM

## 2023-02-21 DIAGNOSIS — K57.90 DIVERTICULOSIS OF INTESTINE WITHOUT BLEEDING, UNSPECIFIED INTESTINAL TRACT LOCATION: ICD-10-CM

## 2023-02-21 DIAGNOSIS — E55.9 VITAMIN D DEFICIENCY: ICD-10-CM

## 2023-02-21 DIAGNOSIS — K21.9 GASTROESOPHAGEAL REFLUX DISEASE WITHOUT ESOPHAGITIS: ICD-10-CM

## 2023-02-21 DIAGNOSIS — Z00.00 ENCOUNTER FOR ANNUAL HEALTH EXAMINATION: Primary | ICD-10-CM

## 2023-02-21 DIAGNOSIS — N99.3 PROLAPSE, POST-HYSTERECTOMY: ICD-10-CM

## 2023-02-21 DIAGNOSIS — K44.9 HIATAL HERNIA: ICD-10-CM

## 2023-02-21 DIAGNOSIS — I10 ESSENTIAL HYPERTENSION: ICD-10-CM

## 2023-02-21 DIAGNOSIS — I73.9 PAD (PERIPHERAL ARTERY DISEASE) (HCC): ICD-10-CM

## 2023-02-21 DIAGNOSIS — R92.2 DENSE BREAST: ICD-10-CM

## 2023-02-21 DIAGNOSIS — M81.8 AGE-RELATED OSTEOPOROSIS WITHOUT FRACTURE: ICD-10-CM

## 2023-02-21 DIAGNOSIS — N81.10 PELVIC ORGAN PROLAPSE QUANTIFICATION STAGE 3 CYSTOCELE: ICD-10-CM

## 2023-02-21 DIAGNOSIS — E78.00 ELEVATED CHOLESTEROL: ICD-10-CM

## 2023-02-21 DIAGNOSIS — Z12.31 ENCOUNTER FOR SCREENING MAMMOGRAM FOR HIGH-RISK PATIENT: ICD-10-CM

## 2023-02-21 DIAGNOSIS — I70.0 AORTIC ATHEROSCLEROSIS (HCC): ICD-10-CM

## 2023-02-21 DIAGNOSIS — I74.5 ILIAC ARTERY OCCLUSION (HCC): ICD-10-CM

## 2023-02-21 PROBLEM — R07.9 ACUTE CHEST PAIN: Status: RESOLVED | Noted: 2022-10-28 | Resolved: 2023-02-21

## 2023-02-21 PROCEDURE — 99213 OFFICE O/P EST LOW 20 MIN: CPT | Performed by: FAMILY MEDICINE

## 2023-02-21 PROCEDURE — G0439 PPPS, SUBSEQ VISIT: HCPCS | Performed by: FAMILY MEDICINE

## 2023-02-21 PROCEDURE — 1126F AMNT PAIN NOTED NONE PRSNT: CPT | Performed by: FAMILY MEDICINE

## 2023-02-21 RX ORDER — IRBESARTAN 150 MG/1
150 TABLET ORAL DAILY
COMMUNITY
Start: 2022-11-30

## 2023-03-29 RX ORDER — ATORVASTATIN CALCIUM 40 MG/1
TABLET, FILM COATED ORAL
Qty: 90 TABLET | Refills: 0 | Status: SHIPPED | OUTPATIENT
Start: 2023-03-29

## 2023-04-05 DIAGNOSIS — K21.9 GASTROESOPHAGEAL REFLUX DISEASE WITHOUT ESOPHAGITIS: ICD-10-CM

## 2023-04-06 RX ORDER — OMEPRAZOLE 20 MG/1
CAPSULE, DELAYED RELEASE ORAL
Qty: 90 CAPSULE | Refills: 0 | Status: SHIPPED | OUTPATIENT
Start: 2023-04-06

## 2023-04-13 ENCOUNTER — OFFICE VISIT (OUTPATIENT)
Dept: FAMILY MEDICINE CLINIC | Facility: CLINIC | Age: 78
End: 2023-04-13
Payer: MEDICARE

## 2023-04-13 VITALS
DIASTOLIC BLOOD PRESSURE: 68 MMHG | SYSTOLIC BLOOD PRESSURE: 124 MMHG | BODY MASS INDEX: 24.6 KG/M2 | HEART RATE: 68 BPM | HEIGHT: 61.5 IN | WEIGHT: 132 LBS | RESPIRATION RATE: 16 BRPM | OXYGEN SATURATION: 99 %

## 2023-04-13 DIAGNOSIS — S39.012D STRAIN OF LUMBAR REGION, SUBSEQUENT ENCOUNTER: Primary | ICD-10-CM

## 2023-04-13 DIAGNOSIS — M47.812 CERVICAL SPONDYLOSIS: ICD-10-CM

## 2023-04-13 PROCEDURE — 99214 OFFICE O/P EST MOD 30 MIN: CPT | Performed by: INTERNAL MEDICINE

## 2023-04-13 RX ORDER — LIDOCAINE 50 MG/G
1 PATCH TOPICAL EVERY 24 HOURS
Qty: 7 PATCH | Refills: 0 | COMMUNITY
Start: 2023-04-12 | End: 2023-04-19

## 2023-04-13 RX ORDER — TIZANIDINE 2 MG/1
TABLET ORAL
COMMUNITY
Start: 2023-04-12

## 2023-04-13 RX ORDER — CYCLOBENZAPRINE HCL 5 MG
5 TABLET ORAL 3 TIMES DAILY PRN
Qty: 20 TABLET | Refills: 0 | Status: SHIPPED | OUTPATIENT
Start: 2023-04-13

## 2023-05-02 DIAGNOSIS — I10 ESSENTIAL HYPERTENSION: ICD-10-CM

## 2023-05-02 RX ORDER — AMLODIPINE BESYLATE AND BENAZEPRIL HYDROCHLORIDE 5; 10 MG/1; MG/1
CAPSULE ORAL
Qty: 90 CAPSULE | Refills: 0 | Status: SHIPPED | OUTPATIENT
Start: 2023-05-02 | End: 2023-05-05

## 2023-05-04 ENCOUNTER — TELEPHONE (OUTPATIENT)
Dept: FAMILY MEDICINE CLINIC | Facility: CLINIC | Age: 78
End: 2023-05-04

## 2023-05-04 DIAGNOSIS — I10 ESSENTIAL HYPERTENSION: ICD-10-CM

## 2023-05-04 NOTE — TELEPHONE ENCOUNTER
LE-  Spoke with patient. Pharmacy wont release amlodipine because she also takes Irbesartan 150mg 1 tab daily prescribed by Dr. Terrance Mueller. [facility] for verbal to pharmacy to release amlodipine?

## 2023-05-04 NOTE — TELEPHONE ENCOUNTER
Ok for plain amlodipine / without the benazepril   If bp high, increase amlodipine to 10mg   Please call with bps next week

## 2023-05-04 NOTE — TELEPHONE ENCOUNTER
Pt said pharmacy won't release the AMLODIPINE BESY-BENAZEPRIL HCL 5-10 MG prescribed by Dr. Jaime Antonio since she's on another bp medication prescribed by her cardiologist.  Pt said she is supposed to be on both.   pls call

## 2023-05-05 RX ORDER — AMLODIPINE BESYLATE 5 MG/1
5 TABLET ORAL DAILY
Qty: 90 TABLET | Refills: 0 | Status: SHIPPED | OUTPATIENT
Start: 2023-05-05

## 2023-06-19 ENCOUNTER — LAB ENCOUNTER (OUTPATIENT)
Dept: LAB | Age: 78
End: 2023-06-19
Attending: FAMILY MEDICINE
Payer: MEDICARE

## 2023-06-19 DIAGNOSIS — E55.9 VITAMIN D DEFICIENCY: ICD-10-CM

## 2023-06-19 DIAGNOSIS — K21.9 GASTROESOPHAGEAL REFLUX DISEASE WITHOUT ESOPHAGITIS: ICD-10-CM

## 2023-06-19 DIAGNOSIS — I10 ESSENTIAL HYPERTENSION: ICD-10-CM

## 2023-06-19 DIAGNOSIS — K57.90 DIVERTICULOSIS OF INTESTINE WITHOUT BLEEDING, UNSPECIFIED INTESTINAL TRACT LOCATION: ICD-10-CM

## 2023-06-19 DIAGNOSIS — E78.00 ELEVATED CHOLESTEROL: ICD-10-CM

## 2023-06-19 LAB
ALBUMIN SERPL-MCNC: 3.7 G/DL (ref 3.4–5)
ALBUMIN/GLOB SERPL: 1.1 {RATIO} (ref 1–2)
ALP LIVER SERPL-CCNC: 76 U/L
ALT SERPL-CCNC: 33 U/L
ANION GAP SERPL CALC-SCNC: 4 MMOL/L (ref 0–18)
AST SERPL-CCNC: 21 U/L (ref 15–37)
BASOPHILS # BLD AUTO: 0.04 X10(3) UL (ref 0–0.2)
BASOPHILS NFR BLD AUTO: 0.6 %
BILIRUB SERPL-MCNC: 0.6 MG/DL (ref 0.1–2)
BUN BLD-MCNC: 18 MG/DL (ref 7–18)
CALCIUM BLD-MCNC: 9.6 MG/DL (ref 8.5–10.1)
CHLORIDE SERPL-SCNC: 110 MMOL/L (ref 98–112)
CHOLEST SERPL-MCNC: 122 MG/DL (ref ?–200)
CO2 SERPL-SCNC: 28 MMOL/L (ref 21–32)
CREAT BLD-MCNC: 0.65 MG/DL
EOSINOPHIL # BLD AUTO: 0.19 X10(3) UL (ref 0–0.7)
EOSINOPHIL NFR BLD AUTO: 2.9 %
ERYTHROCYTE [DISTWIDTH] IN BLOOD BY AUTOMATED COUNT: 13.8 %
FASTING PATIENT LIPID ANSWER: YES
FASTING STATUS PATIENT QL REPORTED: YES
GFR SERPLBLD BASED ON 1.73 SQ M-ARVRAT: 90 ML/MIN/1.73M2 (ref 60–?)
GLOBULIN PLAS-MCNC: 3.3 G/DL (ref 2.8–4.4)
GLUCOSE BLD-MCNC: 97 MG/DL (ref 70–99)
HCT VFR BLD AUTO: 46 %
HDLC SERPL-MCNC: 44 MG/DL (ref 40–59)
HGB BLD-MCNC: 14.7 G/DL
IMM GRANULOCYTES # BLD AUTO: 0.02 X10(3) UL (ref 0–1)
IMM GRANULOCYTES NFR BLD: 0.3 %
LDLC SERPL CALC-MCNC: 54 MG/DL (ref ?–100)
LYMPHOCYTES # BLD AUTO: 1.4 X10(3) UL (ref 1–4)
LYMPHOCYTES NFR BLD AUTO: 21.4 %
MAGNESIUM SERPL-MCNC: 2.4 MG/DL (ref 1.6–2.6)
MCH RBC QN AUTO: 30.2 PG (ref 26–34)
MCHC RBC AUTO-ENTMCNC: 32 G/DL (ref 31–37)
MCV RBC AUTO: 94.5 FL
MONOCYTES # BLD AUTO: 0.62 X10(3) UL (ref 0.1–1)
MONOCYTES NFR BLD AUTO: 9.5 %
NEUTROPHILS # BLD AUTO: 4.27 X10 (3) UL (ref 1.5–7.7)
NEUTROPHILS # BLD AUTO: 4.27 X10(3) UL (ref 1.5–7.7)
NEUTROPHILS NFR BLD AUTO: 65.3 %
NONHDLC SERPL-MCNC: 78 MG/DL (ref ?–130)
OSMOLALITY SERPL CALC.SUM OF ELEC: 296 MOSM/KG (ref 275–295)
PLATELET # BLD AUTO: 377 10(3)UL (ref 150–450)
POTASSIUM SERPL-SCNC: 4.1 MMOL/L (ref 3.5–5.1)
PROT SERPL-MCNC: 7 G/DL (ref 6.4–8.2)
RBC # BLD AUTO: 4.87 X10(6)UL
SODIUM SERPL-SCNC: 142 MMOL/L (ref 136–145)
T4 FREE SERPL-MCNC: 1.2 NG/DL (ref 0.8–1.7)
TRIGL SERPL-MCNC: 137 MG/DL (ref 30–149)
TSI SER-ACNC: 2.89 MIU/ML (ref 0.36–3.74)
VIT B12 SERPL-MCNC: 257 PG/ML (ref 193–986)
VIT D+METAB SERPL-MCNC: 51.2 NG/ML (ref 30–100)
VLDLC SERPL CALC-MCNC: 20 MG/DL (ref 0–30)
WBC # BLD AUTO: 6.5 X10(3) UL (ref 4–11)

## 2023-06-19 PROCEDURE — 85025 COMPLETE CBC W/AUTO DIFF WBC: CPT

## 2023-06-19 PROCEDURE — 84443 ASSAY THYROID STIM HORMONE: CPT

## 2023-06-19 PROCEDURE — 80053 COMPREHEN METABOLIC PANEL: CPT

## 2023-06-19 PROCEDURE — 80061 LIPID PANEL: CPT

## 2023-06-19 PROCEDURE — 84439 ASSAY OF FREE THYROXINE: CPT

## 2023-06-19 PROCEDURE — 82607 VITAMIN B-12: CPT

## 2023-06-19 PROCEDURE — 83735 ASSAY OF MAGNESIUM: CPT

## 2023-06-19 PROCEDURE — 36415 COLL VENOUS BLD VENIPUNCTURE: CPT

## 2023-06-19 PROCEDURE — 82306 VITAMIN D 25 HYDROXY: CPT

## 2023-06-21 ENCOUNTER — OFFICE VISIT (OUTPATIENT)
Dept: FAMILY MEDICINE CLINIC | Facility: CLINIC | Age: 78
End: 2023-06-21
Payer: MEDICARE

## 2023-06-21 VITALS
HEART RATE: 73 BPM | SYSTOLIC BLOOD PRESSURE: 124 MMHG | OXYGEN SATURATION: 95 % | DIASTOLIC BLOOD PRESSURE: 78 MMHG | RESPIRATION RATE: 16 BRPM | BODY MASS INDEX: 25.16 KG/M2 | HEIGHT: 61.5 IN | WEIGHT: 135 LBS

## 2023-06-21 DIAGNOSIS — M54.2 NECK PAIN: Primary | ICD-10-CM

## 2023-06-21 DIAGNOSIS — I10 ESSENTIAL HYPERTENSION: ICD-10-CM

## 2023-06-21 DIAGNOSIS — E53.8 B12 DEFICIENCY: ICD-10-CM

## 2023-06-21 DIAGNOSIS — K44.9 HIATAL HERNIA: ICD-10-CM

## 2023-06-21 DIAGNOSIS — K59.09 CHRONIC CONSTIPATION: ICD-10-CM

## 2023-06-21 PROCEDURE — 99214 OFFICE O/P EST MOD 30 MIN: CPT | Performed by: FAMILY MEDICINE

## 2023-06-21 PROCEDURE — 96372 THER/PROPH/DIAG INJ SC/IM: CPT | Performed by: FAMILY MEDICINE

## 2023-06-21 RX ORDER — CYANOCOBALAMIN 1000 UG/ML
1000 INJECTION, SOLUTION INTRAMUSCULAR; SUBCUTANEOUS ONCE
Status: COMPLETED | OUTPATIENT
Start: 2023-06-21 | End: 2023-06-21

## 2023-06-21 RX ADMIN — CYANOCOBALAMIN 1000 MCG: 1000 INJECTION, SOLUTION INTRAMUSCULAR; SUBCUTANEOUS at 17:17:00

## 2023-06-25 RX ORDER — ATORVASTATIN CALCIUM 40 MG/1
TABLET, FILM COATED ORAL
Qty: 90 TABLET | Refills: 0 | Status: SHIPPED | OUTPATIENT
Start: 2023-06-25

## 2023-06-26 ENCOUNTER — HOSPITAL ENCOUNTER (OUTPATIENT)
Dept: GENERAL RADIOLOGY | Age: 78
Discharge: HOME | End: 2023-06-26
Attending: FAMILY MEDICINE
Payer: MEDICARE

## 2023-06-26 ENCOUNTER — HOSPITAL ENCOUNTER (OUTPATIENT)
Dept: MAMMOGRAPHY | Age: 78
End: 2023-06-26
Attending: FAMILY MEDICINE
Payer: MEDICARE

## 2023-06-26 DIAGNOSIS — M54.2 NECK PAIN: ICD-10-CM

## 2023-06-26 PROCEDURE — 72050 X-RAY EXAM NECK SPINE 4/5VWS: CPT | Performed by: FAMILY MEDICINE

## 2023-06-27 ENCOUNTER — OFFICE VISIT (OUTPATIENT)
Dept: PHYSICAL THERAPY | Age: 78
End: 2023-06-27
Attending: FAMILY MEDICINE
Payer: MEDICARE

## 2023-06-27 DIAGNOSIS — M54.2 NECK PAIN: Primary | ICD-10-CM

## 2023-06-27 PROCEDURE — 97112 NEUROMUSCULAR REEDUCATION: CPT

## 2023-06-27 PROCEDURE — 97110 THERAPEUTIC EXERCISES: CPT

## 2023-06-27 PROCEDURE — 97140 MANUAL THERAPY 1/> REGIONS: CPT

## 2023-06-27 PROCEDURE — 97161 PT EVAL LOW COMPLEX 20 MIN: CPT

## 2023-06-30 ENCOUNTER — OFFICE VISIT (OUTPATIENT)
Dept: PHYSICAL THERAPY | Age: 78
End: 2023-06-30
Attending: FAMILY MEDICINE
Payer: MEDICARE

## 2023-06-30 PROCEDURE — 97140 MANUAL THERAPY 1/> REGIONS: CPT

## 2023-06-30 PROCEDURE — 97112 NEUROMUSCULAR REEDUCATION: CPT

## 2023-06-30 PROCEDURE — 97110 THERAPEUTIC EXERCISES: CPT

## 2023-07-03 ENCOUNTER — NURSE ONLY (OUTPATIENT)
Dept: FAMILY MEDICINE CLINIC | Facility: CLINIC | Age: 78
End: 2023-07-03
Payer: MEDICARE

## 2023-07-03 VITALS — DIASTOLIC BLOOD PRESSURE: 84 MMHG | SYSTOLIC BLOOD PRESSURE: 126 MMHG

## 2023-07-03 DIAGNOSIS — K21.9 GASTROESOPHAGEAL REFLUX DISEASE WITHOUT ESOPHAGITIS: ICD-10-CM

## 2023-07-03 RX ORDER — OMEPRAZOLE 20 MG/1
20 CAPSULE, DELAYED RELEASE ORAL DAILY
Qty: 90 CAPSULE | Refills: 0 | Status: SHIPPED | OUTPATIENT
Start: 2023-07-03

## 2023-07-06 ENCOUNTER — OFFICE VISIT (OUTPATIENT)
Dept: PHYSICAL THERAPY | Age: 78
End: 2023-07-06
Attending: FAMILY MEDICINE
Payer: MEDICARE

## 2023-07-06 PROCEDURE — 97110 THERAPEUTIC EXERCISES: CPT

## 2023-07-06 PROCEDURE — 97140 MANUAL THERAPY 1/> REGIONS: CPT

## 2023-07-06 NOTE — PROGRESS NOTES
Diagnosis:     Neck pain (M54.2)       Referring Provider: Rut Vines  Date of Evaluation:    6/27/23     Precautions:   Fall Risk, Visual Impairment, fall risk due to osteoporosis  Next MD visit:   none scheduled  Date of Surgery: n/a   Insurance Primary/Secondary: MEDICARE / Alfredo Torres     # Auth Visits:8            Subjective: pt stated that the neck pain \" is still there but I have less HA \". Patient stated that she continue to have some difficulty with bringing her neck to the side \" due to some pain and tightness and due to decrease mobility \". Patient stated that she perform her HEP 2 times a day . Pain: 0/10 at best with worst level not asked       Objective: see grid below for treatment. Pt arom has increased 5 to 7 deg in all planes compared to evaluation       Assessment: continued with manual intervention for B cervical paraspinals with B UT with patient demo significant improvement in soft tissue extensibility with R/L cervical rotation and B SB without end range pain reported . Added PROM R/L sh flexion / scaption /ER and horizontal abd to facilitate pectoral mm tightness and promote improved postural alignment with sitting and standing . Added yellow TB for scapula ex' s to further progress postural stability during  sustained upright activities with patient's good tolerance .       Goals:   Goals: (to be met in 8  visits)   Decrease pain to be 0 to 4/10 to allow pt to sleep with minimal pain overall 40 to 50 percent easier  Decrease tenderness/tightness right c-spine mm region 40 to 50 percent to allow pt to dry her hair with minimal pain/difficulty overall 40 to 50 percent easier  Increase arom of c-spine 10 to 15 deg in all deficit planes to allow pt to turn her head/neck and drive overall 40 to 50 percent easier  Improve posture 15 to 20 percent to allow pt to read 40 to 50 percent easier  Pt to be independent with proper posture/body mechanics and HEP   Increase strength of right UE to be 5/5 to allow pt to dress and dry her hair overall 40 to 50 percent easier    Plan: continue with poc gradually increasing ex as tolerated working toward goals stated above. Monitor effects of adding new manual therapy and consider B shoulder flexion with cane  Date: 7/2/2023  TX#: 2/8 Date: 7/06/23                TX#: 3/8 Date:                 TX#: 4/ Date:                 TX#: 5/ Date:    Tx#: 6/   Manual therapy 20 min x2    Stm/graston to right neck/c-spine/upper/mid traps/levator scapulae    Supine occipital release  Supine gentle c-spine distraction    Supine c-spine prom all planes except flexion   MANUAL Therapy x 25 min   Supine :  Occipital releases followed by gentle manual cervical traction with gentle distraction   STM to B CS paraspinals and B UT         Neuro re-ed 12 min x1  Reviewed proper head,neck, shoulder alignment     Reviewed posture check against the wall 5 to 10x 5 to 10 sec    Instructed pt to place towel roll in pillow to support c-spine when sleeping  Reviewed proper sleeping posture for c-spine using pt's pillows and towel she brought in   THERE EX X 20 MIN   Supine :  PROM R/L sh with end range stretch :  Flex x 10 with 10 sec hold   Scaption x 10 with 10 sec hold   ER x 10 with 10 sec hold   Horizontal abd x 10 with 10 sec hold   AAROM with cane :  B shoulder x 10         Therex 12 min x1    Supine/sitting/standing c-spine arom all planes except flexion 5 to 10x 5 to 10 sec    Sitting/standing c-spine and B scapulae retraction 5 to 10x 5 ot 10 sec    Supine B UE reaching for feet with B scapulae retraction/depression 10x 10 sec    Supine B shoulder/scapulae retraction and depression-reaching for feet 10x 10 sec Seated :  R/L cervical SB  gentle stretch with belt assist x 10 reps each with 10 sec hold   Cervical AROM in all planes x 10 reps each   Seated :  Rows with YTB x 10  B sh ext with YTB x 10             HEP: all ex to be performed at home     Charges: man x 2,there ex x1  Total Timed Treatment: 45 min    Total Treatment Time: 45 min

## 2023-07-11 ENCOUNTER — OFFICE VISIT (OUTPATIENT)
Dept: PHYSICAL THERAPY | Age: 78
End: 2023-07-11
Attending: FAMILY MEDICINE
Payer: MEDICARE

## 2023-07-11 PROCEDURE — 97110 THERAPEUTIC EXERCISES: CPT

## 2023-07-11 PROCEDURE — 97140 MANUAL THERAPY 1/> REGIONS: CPT

## 2023-07-11 NOTE — PROGRESS NOTES
Diagnosis:     Neck pain (M54.2)       Referring Provider: Nany Tao  Date of Evaluation:    6/27/23     Precautions:   Fall Risk, Visual Impairment, fall risk due to osteoporosis  Next MD visit:   none scheduled  Date of Surgery: n/a   Insurance Primary/Secondary: MEDICARE / Odell Latham     # Auth Visits:8            Subjective: pt states her headaches have decreased 50 percent since starting therapy. Ex are going ok. Pain: 0 to 4/10 today       Objective: posture has significantly improved since starting therapy approx 20 percent more neutral      Assessment: pt making good progress with decreased pain and better posture. She continues to have mm tightness B c-spine mm. She did well with new side glides added to improve c-spine mobility. Advanced pt to red t-band for B UE rows/ext for home      Goals:   Goals: (to be met in 8  visits)   Decrease pain to be 0 to 4/10 to allow pt to sleep with minimal pain overall 40 to 50 percent easier  Decrease tenderness/tightness right c-spine mm region 40 to 50 percent to allow pt to dry her hair with minimal pain/difficulty overall 40 to 50 percent easier  Increase arom of c-spine 10 to 15 deg in all deficit planes to allow pt to turn her head/neck and drive overall 40 to 50 percent easier  Improve posture 15 to 20 percent to allow pt to read 40 to 50 percent easier  Pt to be independent with proper posture/body mechanics and HEP   Increase strength of right UE to be 5/5 to allow pt to dress and dry her hair overall 40 to 50 percent easier    Plan: continue with poc gradually increasing ex as tolerated working toward goals stated above. Monitor effects of adding side glides to c-spine and consider graston to left neck mm and nu step next  Date: 7/2/2023  TX#: 2/8 Date: 7/06/23                TX#: 3/8 Date:     7/11/23            TX#: 4/8 Date:                 TX#: 5/ Date:    Tx#: 6/   Manual therapy 20 min x2    Stm/graston to right neck/c-spine/upper/mid traps/levator scapulae    Supine occipital release  Supine gentle c-spine distraction    Supine c-spine prom all planes except flexion   MANUAL Therapy x 25 min   Supine :  Occipital releases followed by gentle manual cervical traction with gentle distraction   STM to B CS paraspinals and B UT    Sitting hot pack to right neck 10 min prior to manual therapy  Manual therapy 23 min  Same      Stm/graston to right neck region  Supine c-spine gentle side glides and prom to end range capsular stretch       Neuro re-ed 12 min x1  Reviewed proper head,neck, shoulder alignment     Reviewed posture check against the wall 5 to 10x 5 to 10 sec    Instructed pt to place towel roll in pillow to support c-spine when sleeping  Reviewed proper sleeping posture for c-spine using pt's pillows and towel she brought in   THERE EX X 20 MIN   Supine :  PROM R/L sh with end range stretch :  Flex x 10 with 10 sec hold   Scaption x 10 with 10 sec hold   ER x 10 with 10 sec hold   Horizontal abd x 10 with 10 sec hold   AAROM with cane :  B shoulder x 10  Therex 20 min    As stated above  Supine reaching for feet c-spine arom all planes 5 to 10x 5 to 10 sec except flexion  Supine cane shoulder flexion and ER 10x 5 sec ea  Standing posture check against the wall with c-spine and B scapulae retraction and depression and c-spine arom all planes except flexion 5 to 10x 5 to 10 sec  Standing B rowing/ext with red t-band 10x ea pt given band for home             Therex 12 min x1    Supine/sitting/standing c-spine arom all planes except flexion 5 to 10x 5 to 10 sec    Sitting/standing c-spine and B scapulae retraction 5 to 10x 5 ot 10 sec    Supine B UE reaching for feet with B scapulae retraction/depression 10x 10 sec    Supine B shoulder/scapulae retraction and depression-reaching for feet 10x 10 sec Seated :  R/L cervical SB  gentle stretch with belt assist x 10 reps each with 10 sec hold   Cervical AROM in all planes x 10 reps each   Seated :  Rows with YTB x 10  B sh ext with YTB x 10             HEP: all ex to be performed at home     Charges: man x 2,there ex x1  Total Timed Treatment: 43 min    Total Treatment Time: 53 min

## 2023-07-13 DIAGNOSIS — K21.9 GASTROESOPHAGEAL REFLUX DISEASE WITHOUT ESOPHAGITIS: ICD-10-CM

## 2023-07-13 RX ORDER — OMEPRAZOLE 20 MG/1
20 CAPSULE, DELAYED RELEASE ORAL DAILY
Qty: 90 CAPSULE | Refills: 0 | Status: SHIPPED | OUTPATIENT
Start: 2023-07-13

## 2023-07-13 NOTE — TELEPHONE ENCOUNTER
A refill request was received for:  Requested Prescriptions     Pending Prescriptions Disp Refills    OMEPRAZOLE 20 MG Oral Capsule Delayed Release [Pharmacy Med Name: Omeprazole Dr 20 Mg Cap Nort] 90 capsule 0     Sig: TAKE ONE CAPSULE BY MOUTH ONE TIME DAILY       Last refill date: 7/3/2023      Last office visit:6/21/2023     Follow up due:  Future Appointments   Date Time Provider Sabino Childs   7/14/2023 10:30 AM Cheryl Cuellar, PT [facility]   7/17/2023 12:30 PM Keyon Yarbrough PTA PF [facility]   7/24/2023 11:45 AM Cheryl Cuellar, PT PF [facility]   11/1/2023  1:00 PM NOLAN, PROCEDURE SGIEDW None   11/1/2023  1:30 PM NOLAN, PROCEDURE SGIEDW None

## 2023-07-14 ENCOUNTER — OFFICE VISIT (OUTPATIENT)
Dept: PHYSICAL THERAPY | Age: 78
End: 2023-07-14
Attending: FAMILY MEDICINE
Payer: MEDICARE

## 2023-07-14 PROCEDURE — 97110 THERAPEUTIC EXERCISES: CPT

## 2023-07-14 PROCEDURE — 97140 MANUAL THERAPY 1/> REGIONS: CPT

## 2023-07-14 NOTE — PROGRESS NOTES
Diagnosis:     Neck pain (M54.2)       Referring Provider: Raffy Renae  Date of Evaluation:    6/27/23     Precautions:   Fall Risk, Visual Impairment, fall risk due to osteoporosis  Next MD visit:   none scheduled  Date of Surgery: n/a   Insurance Primary/Secondary: MEDICARE / [employer]     # Auth Visits:8            Subjective: pt states she got nauseated after last session-not sure if it was therapy because she believes she overdid her activity level that day. Her neck feeling good today. Overall the increased pain is occurring 60 percent less. Still difficult turning her head to left but overall 50 percent easier turning to right     Pain: 0 to 3/10 most of the time      Objective: pt continues to have tightness B upper/middle traps and levator scapulae-although right side has decreased since initial eval.      Assessment: added stm/graston to right neck which was tolerated well. despite being nauseated after last session pt is making good progress toward goals with increasing arom of c-spine and improving posture. No new ex today due to pt requesting to take it easy. Goals:   Goals: (to be met in 8  visits)   Decrease pain to be 0 to 4/10 to allow pt to sleep with minimal pain overall 40 to 50 percent easier  Decrease tenderness/tightness right c-spine mm region 40 to 50 percent to allow pt to dry her hair with minimal pain/difficulty overall 40 to 50 percent easier  Increase arom of c-spine 10 to 15 deg in all deficit planes to allow pt to turn her head/neck and drive overall 40 to 50 percent easier  Improve posture 15 to 20 percent to allow pt to read 40 to 50 percent easier  Pt to be independent with proper posture/body mechanics and HEP   Increase strength of right UE to be 5/5 to allow pt to dress and dry her hair overall 40 to 50 percent easier    Plan: continue with poc gradually increasing ex as tolerated working toward goals stated above.  Monitor effects of adding stm/graston to left c-spine and consider adding nu step next  Date: 7/2/2023  TX#: 2/8 Date: 7/06/23                TX#: 3/8 Date:     7/11/23            TX#: 4/8 Date:   7/14/23           TX#: 5/8 Date:    Tx#: 6/   Manual therapy 20 min x2    Stm/graston to right neck/c-spine/upper/mid traps/levator scapulae    Supine occipital release  Supine gentle c-spine distraction    Supine c-spine prom all planes except flexion   MANUAL Therapy x 25 min   Supine :  Occipital releases followed by gentle manual cervical traction with gentle distraction   STM to B CS paraspinals and B UT    Sitting hot pack to right neck 10 min prior to manual therapy  Manual therapy 23 min  Same      Stm/graston to right neck region  Supine c-spine gentle side glides and prom to end range capsular stretch   Same B neck    Same  Same      Same equal time spent B sides of c-spine    same      Neuro re-ed 12 min x1  Reviewed proper head,neck, shoulder alignment     Reviewed posture check against the wall 5 to 10x 5 to 10 sec    Instructed pt to place towel roll in pillow to support c-spine when sleeping  Reviewed proper sleeping posture for c-spine using pt's pillows and towel she brought in   THERE EX X 20 MIN   Supine :  PROM R/L sh with end range stretch :  Flex x 10 with 10 sec hold   Scaption x 10 with 10 sec hold   ER x 10 with 10 sec hold   Horizontal abd x 10 with 10 sec hold   AAROM with cane :  B shoulder x 10  Therex 20 min    As stated above  Supine reaching for feet c-spine arom all planes 5 to 10x 5 to 10 sec except flexion  Supine cane shoulder flexion and ER 10x 5 sec ea  Standing posture check against the wall with c-spine and B scapulae retraction and depression and c-spine arom all planes except flexion 5 to 10x 5 to 10 sec  Standing B rowing/ext with red t-band 10x ea pt given band for home       Therex      Same      Same      Same          same      Therex 12 min x1    Supine/sitting/standing c-spine arom all planes except flexion 5 to 10x 5 to 10 sec    Sitting/standing c-spine and B scapulae retraction 5 to 10x 5 ot 10 sec    Supine B UE reaching for feet with B scapulae retraction/depression 10x 10 sec    Supine B shoulder/scapulae retraction and depression-reaching for feet 10x 10 sec Seated :  R/L cervical SB  gentle stretch with belt assist x 10 reps each with 10 sec hold   Cervical AROM in all planes x 10 reps each   Seated :  Rows with YTB x 10  B sh ext with YTB x 10             HEP: all ex to be performed at home     Charges: man x 2,there ex x1  Total Timed Treatment: 43 min    Total Treatment Time: 53 min

## 2023-07-17 ENCOUNTER — OFFICE VISIT (OUTPATIENT)
Dept: PHYSICAL THERAPY | Age: 78
End: 2023-07-17
Attending: FAMILY MEDICINE
Payer: MEDICARE

## 2023-07-17 PROCEDURE — 97110 THERAPEUTIC EXERCISES: CPT

## 2023-07-17 PROCEDURE — 97140 MANUAL THERAPY 1/> REGIONS: CPT

## 2023-07-17 NOTE — PROGRESS NOTES
Diagnosis:     Neck pain (M54.2)       Referring Provider: Abel Collins  Date of Evaluation:    6/27/23     Precautions:   Fall Risk, Visual Impairment, fall risk due to osteoporosis  Next MD visit:   none scheduled  Date of Surgery: n/a   Insurance Primary/Secondary: MEDICARE / [employer]     # Auth Visits:8            Subjective: pt states  that she  continue feeling  nauseated  and didn't have good sleep last night . Patient stated that her neck \" is feeling about korey same since the start of PT \". Pain: 0 to 3/10 most of the time      Objective: pt continues to have tightness B upper/middle traps and levator scapulae-although right side has decreased since initial eval.      Assessment: increased reps on some of the scapula ex's  in standing position to further progress trunk stability with sustained standing and sitting . Provided verbal and manual cueing for correct scapula activation to ensure proper form . No new ex today due to pt requesting to take it easy due to feeling tired from lock of sleep last night . Goals:   Goals: (to be met in 8  visits)   Decrease pain to be 0 to 4/10 to allow pt to sleep with minimal pain overall 40 to 50 percent easier  Decrease tenderness/tightness right c-spine mm region 40 to 50 percent to allow pt to dry her hair with minimal pain/difficulty overall 40 to 50 percent easier  Increase arom of c-spine 10 to 15 deg in all deficit planes to allow pt to turn her head/neck and drive overall 40 to 50 percent easier  Improve posture 15 to 20 percent to allow pt to read 40 to 50 percent easier  Pt to be independent with proper posture/body mechanics and HEP   Increase strength of right UE to be 5/5 to allow pt to dress and dry her hair overall 40 to 50 percent easier    Plan: continue with poc gradually increasing ex as tolerated working toward goals stated above.  Monitor effects of adding stm/graston to left c-spine and consider adding nu step next  Date: 7/2/2023  TX#: 2/8 Date: 7/06/23                TX#: 3/8 Date:     7/11/23            TX#: 4/8 Date:   7/14/23           TX#: 5/8 Date: 7/17/23  Tx#: 6/8   Manual therapy 20 min x2    Stm/graston to right neck/c-spine/upper/mid traps/levator scapulae    Supine occipital release  Supine gentle c-spine distraction    Supine c-spine prom all planes except flexion   MANUAL Therapy x 25 min   Supine :  Occipital releases followed by gentle manual cervical traction with gentle distraction   STM to B CS paraspinals and B UT    Sitting hot pack to right neck 10 min prior to manual therapy  Manual therapy 23 min  Same      Stm/graston to right neck region  Supine c-spine gentle side glides and prom to end range capsular stretch   Same B neck    Same  Same      Same equal time spent B sides of c-spine    same   Seated :  HP to B CS x 5 min   Manual therapy x 25 min   Supine :  STM to R /L neck   And R/L UT   Occipital releases    Neuro re-ed 12 min x1  Reviewed proper head,neck, shoulder alignment     Reviewed posture check against the wall 5 to 10x 5 to 10 sec    Instructed pt to place towel roll in pillow to support c-spine when sleeping  Reviewed proper sleeping posture for c-spine using pt's pillows and towel she brought in   THERE EX X 20 MIN   Supine :  PROM R/L sh with end range stretch :  Flex x 10 with 10 sec hold   Scaption x 10 with 10 sec hold   ER x 10 with 10 sec hold   Horizontal abd x 10 with 10 sec hold   AAROM with cane :  B shoulder x 10  Therex 20 min    As stated above  Supine reaching for feet c-spine arom all planes 5 to 10x 5 to 10 sec except flexion  Supine cane shoulder flexion and ER 10x 5 sec ea  Standing posture check against the wall with c-spine and B scapulae retraction and depression and c-spine arom all planes except flexion 5 to 10x 5 to 10 sec  Standing B rowing/ext with red t-band 10x ea pt given band for home       Therex      Same      Same      Same          same There ex : 15 min   Supine :  AROM CS rotation x 10  Reps each side   AROM CS SB x 10 reps each side   Cane shoulder flex x 10  Cane shoulder ER  x 10  Standing against wall :  B scapula retraction with depression x 10  AROM of CS :  R/L rot x 10 reps each   R/L SB x 10 reps   Rows with RTB  x 15  B sh ext with RTB x 15     Therex 12 min x1    Supine/sitting/standing c-spine arom all planes except flexion 5 to 10x 5 to 10 sec    Sitting/standing c-spine and B scapulae retraction 5 to 10x 5 ot 10 sec    Supine B UE reaching for feet with B scapulae retraction/depression 10x 10 sec    Supine B shoulder/scapulae retraction and depression-reaching for feet 10x 10 sec Seated :  R/L cervical SB  gentle stretch with belt assist x 10 reps each with 10 sec hold   Cervical AROM in all planes x 10 reps each   Seated :  Rows with YTB x 10  B sh ext with YTB x 10             HEP: all ex to be performed at home     Charges: man x 2,there ex x1  Total Timed Treatment: 43 min    Total Treatment Time: 53 min

## 2023-07-19 ENCOUNTER — APPOINTMENT (OUTPATIENT)
Dept: PHYSICAL THERAPY | Age: 78
End: 2023-07-19
Attending: FAMILY MEDICINE
Payer: MEDICARE

## 2023-07-21 ENCOUNTER — APPOINTMENT (OUTPATIENT)
Dept: PHYSICAL THERAPY | Age: 78
End: 2023-07-21
Attending: FAMILY MEDICINE
Payer: MEDICARE

## 2023-07-24 ENCOUNTER — TELEPHONE (OUTPATIENT)
Dept: PHYSICAL THERAPY | Facility: HOSPITAL | Age: 78
End: 2023-07-24

## 2023-07-24 ENCOUNTER — OFFICE VISIT (OUTPATIENT)
Dept: PHYSICAL THERAPY | Age: 78
End: 2023-07-24
Attending: FAMILY MEDICINE
Payer: MEDICARE

## 2023-07-24 PROCEDURE — 97110 THERAPEUTIC EXERCISES: CPT

## 2023-07-24 PROCEDURE — 97140 MANUAL THERAPY 1/> REGIONS: CPT

## 2023-07-24 NOTE — PROGRESS NOTES
Diagnosis:     Neck pain (M54.2)       Referring Provider: Dennise Corbin  Date of Evaluation:    6/27/23     Precautions:   Fall Risk, Visual Impairment, fall risk due to osteoporosis  Next MD visit:   none scheduled  Date of Surgery: n/a   Insurance Primary/Secondary: MEDICARE / Kenny Sanchez     # Auth Visits:8            Subjective: pt states she has made progress since starting therapy. She use to have headaches all the time and now she has had no headache for about 3 weeks. When her neck pain increases she does her neck ex and she gets relief. She did have a lot of pain 2 nights ago after sitting at her birthday dinner and turning her head to the right for 1 to 2 hours. She ended up taking tylenol and ibuprofen and then she was able to sleep. Pt has not been performing her cane ex at home. she believes that she may be able to be discharged after next session because she feels she can do her ex at home  Pain: 0/10 at best and 8/10 at worst      Objective:  tenderness/tightness has significantly decreased in B upper/mid trap, levator scapulae and c-spine arom has increased in all planes  compared to evaluation. Assessment:  despite having more pain over the weekend. Today pt's arom of c-spine has increased significantly since starting therapy. Pt was reminded about the importance of performing her HEP.       Goals: (to be met in 8  visits)   Decrease pain to be 0 to 4/10 to allow pt to sleep with minimal pain overall 40 to 50 percent easier  Decrease tenderness/tightness right c-spine mm region 40 to 50 percent to allow pt to dry her hair with minimal pain/difficulty overall 40 to 50 percent easier  Increase arom of c-spine 10 to 15 deg in all deficit planes to allow pt to turn her head/neck and drive overall 40 to 50 percent easier  Improve posture 15 to 20 percent to allow pt to read 40 to 50 percent easier  Pt to be independent with proper posture/body mechanics and HEP   Increase strength of right UE to be 5/5 to allow pt to dress and dry her hair overall 40 to 50 percent easier    Plan: continue with poc gradually increasing ex as tolerated working toward goals stated above.  Monitor effects of new ex added and re-evaluate next session and possibly discharge  Date: 7/2/2023  TX#: 2/8 Date: 7/06/23                TX#: 3/8 Date:     7/11/23            TX#: 4/8 Date:   7/14/23           TX#: 5/8 Date: 7/17/23  Tx#: 6/8 Date 7/24/23  Tx 7/8   Manual therapy 20 min x2    Stm/graston to right neck/c-spine/upper/mid traps/levator scapulae    Supine occipital release  Supine gentle c-spine distraction    Supine c-spine prom all planes except flexion   MANUAL Therapy x 25 min   Supine :  Occipital releases followed by gentle manual cervical traction with gentle distraction   STM to B CS paraspinals and B UT    Sitting hot pack to right neck 10 min prior to manual therapy  Manual therapy 23 min  Same      Stm/graston to right neck region  Supine c-spine gentle side glides and prom to end range capsular stretch   Same B neck    Same  Same      Same equal time spent B sides of c-spine    same   Seated :  HP to B CS x 5 min   Manual therapy x 25 min   Supine :  STM to R /L neck   And R/L UT   Occipital releases  Sitting hot pack B neck 10 mijn    Manual therapy 23 min  Sitting stm/graston B neck right more than left  Occipital release in supine  C-spine distraction  Supine c-spine prom all planes except flexion  Gentle side glides in supine to c-spine     Neuro re-ed 12 min x1  Reviewed proper head,neck, shoulder alignment     Reviewed posture check against the wall 5 to 10x 5 to 10 sec    Instructed pt to place towel roll in pillow to support c-spine when sleeping  Reviewed proper sleeping posture for c-spine using pt's pillows and towel she brought in   THERE EX X 20 MIN   Supine :  PROM R/L sh with end range stretch :  Flex x 10 with 10 sec hold   Scaption x 10 with 10 sec hold   ER x 10 with 10 sec hold   Horizontal abd x 10 with 10 sec hold   AAROM with cane :  B shoulder x 10  Therex 20 min    As stated above  Supine reaching for feet c-spine arom all planes 5 to 10x 5 to 10 sec except flexion  Supine cane shoulder flexion and ER 10x 5 sec ea  Standing posture check against the wall with c-spine and B scapulae retraction and depression and c-spine arom all planes except flexion 5 to 10x 5 to 10 sec  Standing B rowing/ext with red t-band 10x ea pt given band for home       Therex      Same      Same      Same          same There ex : 15 min   Supine :  AROM CS rotation x 10  Reps each side   AROM CS SB x 10 reps each side   Cane shoulder flex x 10  Cane shoulder ER  x 10  Standing against wall :  B scapula retraction with depression x 10  AROM of CS :  R/L rot x 10 reps each   R/L SB x 10 reps   Rows with RTB  x 15  B sh ext with RTB x 15 Therex 17 min  Supine, sitting/standing c-spine arom all planes except flexion  5 to 10x 5 to 10 sec  Supine shoulder flexion with cane 10x 5 sec    Not today  Standing yellow sports cord rowing/ext 10x ea  Standing B anterior chest/shoulder stretch in doorway 5x 10 sec  Standing c-spine and scapulae retraction 5 to 10x 5 to 10 sec     Therex 12 min x1    Supine/sitting/standing c-spine arom all planes except flexion 5 to 10x 5 to 10 sec    Sitting/standing c-spine and B scapulae retraction 5 to 10x 5 ot 10 sec    Supine B UE reaching for feet with B scapulae retraction/depression 10x 10 sec    Supine B shoulder/scapulae retraction and depression-reaching for feet 10x 10 sec Seated :  R/L cervical SB  gentle stretch with belt assist x 10 reps each with 10 sec hold   Cervical AROM in all planes x 10 reps each   Seated :  Rows with YTB x 10  B sh ext with YTB x 10               HEP: all ex to be performed at home     Charges: man x 2,there ex x1  Total Timed Treatment: 40 min    Total Treatment Time: 50 min

## 2023-08-04 ENCOUNTER — TELEPHONE (OUTPATIENT)
Dept: FAMILY MEDICINE CLINIC | Facility: CLINIC | Age: 78
End: 2023-08-04

## 2023-08-04 RX ORDER — AMLODIPINE BESYLATE 5 MG/1
5 TABLET ORAL DAILY
Qty: 90 TABLET | Refills: 0 | Status: SHIPPED | OUTPATIENT
Start: 2023-08-04

## 2023-08-04 NOTE — TELEPHONE ENCOUNTER
A refill request was received for:  Requested Prescriptions     Pending Prescriptions Disp Refills    amLODIPine 5 MG Oral Tab 90 tablet 0     Sig: Take 1 tablet (5 mg total) by mouth daily.        Last refill date:5/5/2023     My chart sent reminding patient she will be due for follow up in September  Last office visit:6/21/2023    Follow up due:  Future Appointments   Date Time Provider Sabino Childs   8/25/2023  1:45 PM VICENTA Barbour PT   11/1/2023  1:00 PM NOLAN, MINGO [facility] None   11/1/2023  1:30 PM MINGO FRANCISCO [facility] None

## 2023-08-08 ENCOUNTER — APPOINTMENT (OUTPATIENT)
Dept: PHYSICAL THERAPY | Age: 78
End: 2023-08-08
Attending: FAMILY MEDICINE
Payer: MEDICARE

## 2023-08-25 ENCOUNTER — OFFICE VISIT (OUTPATIENT)
Dept: PHYSICAL THERAPY | Age: 78
End: 2023-08-25
Attending: FAMILY MEDICINE
Payer: MEDICARE

## 2023-08-25 PROCEDURE — 97140 MANUAL THERAPY 1/> REGIONS: CPT

## 2023-08-25 PROCEDURE — 97110 THERAPEUTIC EXERCISES: CPT

## 2023-08-25 NOTE — PROGRESS NOTES
Diagnosis:     Neck pain (M54.2)       Referring Provider: Pippa Wright  Date of Evaluation:    6/27/23     Precautions:   Fall Risk, Visual Impairment, fall risk due to osteoporosis  Next MD visit:   none scheduled  Date of Surgery: n/a   Insurance Primary/Secondary: Jenny Tejada / Kerrie Arriaga     # Auth Visits:8           Progress Summary  Pt has attended 8 visits in Physical Therapy-last session on 7/24/23-over a month ago. .    Subjective:  Since her last session about a month ago pt overall continues to feel better since starting therapy especially during the day. She has pain/difficulty with sleeping at night on her sides.-she does not sleep on her back. Overall she can dry her hair, turn her head/neck, and dress  80 percent better since starting therapy and now she can drive normally. Reading is not to bad if she uses her ear phones with her tyrell but overall it is approx same. Pain level 0/10 at best and 7/10 at worst  Objective:  -c-spine arom has increased 15 to 20 degrees in all deficit planes since evaluation  -tenderness/tightness c-spine mm region has decreased 80 percent since starting therapy   -posture has improved 15 to 20 percent more neutral   -strength of right UE has increased to 5/5     Assessment:    Pt has met most goals with exception of sleeping. She is independent with proper posture, body mechanics and HEP. Pt also understands proper sleeping posture.  She may need to continue with therapy to work toward that remaining goal    Goals: (to be met in 8  visits)   Decrease pain to be 0 to 4/10 to allow pt to sleep with minimal pain overall 40 to 50 percent easier-not met  Decrease tenderness/tightness right c-spine mm region 40 to 50 percent to allow pt to dry her hair with minimal pain/difficulty overall 40 to 50 percent easier-met  Increase arom of c-spine 10 to 15 deg in all deficit planes to allow pt to turn her head/neck and drive overall 40 to 50 percent easier-met  Improve posture 15 to 20 percent to allow pt to read 40 to 50 percent easier-partially met  Pt to be independent with proper posture/body mechanics and HEP -met  Increase strength of right UE to be 5/5 to allow pt to dress and dry her hair overall 40 to 50 percent easier-met    Neck Disability Index Score  Score: 36 % (6/29/2023  3:04 PM)    Post Neck Disability Index Score  Post Score: 20 % (8/25/2023  3:06 PM)    16 % improvement    Plan: Continue skilled Physical Therapy 2 x/week or a total of 8 visits over a 90 day period. Treatment will include: continuation of manual therapy and exercises to work toward remaining sleep goal not yet met. Patient/Family/Caregiver was advised of these findings, precautions, and treatment options and has agreed to actively participate in planning and for this course of care. Thank you for your referral. If you have any questions, please contact me at Dept: [phone]. Sincerely,  Electronically signed by therapist: Jalene Closs, PT     Physician's certification required:  Yes  Please co-sign or sign and return this letter via fax as soon as possible to [phone]. I certify the need for these services furnished under this plan of treatment and while under my care.     X___________________________________________________ Date____________________    Certification From: 9/72/0174  To:11/26/2023    Date: 7/2/2023  TX#: 2/8 Date: 7/06/23                TX#: 3/8 Date:     7/11/23            TX#: 4/8 Date:   7/14/23           TX#: 5/8 Date: 7/17/23  Tx#: 6/8 Date 7/24/23  Tx 7/8 8/25/23 8/8   Manual therapy 20 min x2    Stm/graston to right neck/c-spine/upper/mid traps/levator scapulae    Supine occipital release  Supine gentle c-spine distraction    Supine c-spine prom all planes except flexion   MANUAL Therapy x 25 min   Supine :  Occipital releases followed by gentle manual cervical traction with gentle distraction   STM to B CS paraspinals and B UT    Sitting hot pack to right neck 10 min prior to manual therapy  Manual therapy 23 min  Same      Stm/graston to right neck region  Supine c-spine gentle side glides and prom to end range capsular stretch   Same B neck    Same  Same      Same equal time spent B sides of c-spine    same   Seated :  HP to B CS x 5 min   Manual therapy x 25 min   Supine :  STM to R /L neck   And R/L UT   Occipital releases  Sitting hot pack B neck 10 mijn    Manual therapy 23 min  Sitting stm/graston B neck right more than left  Occipital release in supine  C-spine distraction  Supine c-spine prom all planes except flexion  Gentle side glides in supine to c-spine   Manual therapy 23 min  Re-evaluation      Stm only left neck    Occipital release in supine  C-spine distraction  Supine c-spine prom all planes except flexion       Neuro re-ed 12 min x1  Reviewed proper head,neck, shoulder alignment     Reviewed posture check against the wall 5 to 10x 5 to 10 sec    Instructed pt to place towel roll in pillow to support c-spine when sleeping  Reviewed proper sleeping posture for c-spine using pt's pillows and towel she brought in   THERE EX X 20 MIN   Supine :  PROM R/L sh with end range stretch :  Flex x 10 with 10 sec hold   Scaption x 10 with 10 sec hold   ER x 10 with 10 sec hold   Horizontal abd x 10 with 10 sec hold   AAROM with cane :  B shoulder x 10  Therex 20 min    As stated above  Supine reaching for feet c-spine arom all planes 5 to 10x 5 to 10 sec except flexion  Supine cane shoulder flexion and ER 10x 5 sec ea  Standing posture check against the wall with c-spine and B scapulae retraction and depression and c-spine arom all planes except flexion 5 to 10x 5 to 10 sec  Standing B rowing/ext with red t-band 10x ea pt given band for home       Therex      Same      Same      Same          same There ex : 15 min   Supine :  AROM CS rotation x 10  Reps each side   AROM CS SB x 10 reps each side   Cane shoulder flex x 10  Cane shoulder ER  x 10  Standing against wall :  B scapula retraction with depression x 10  AROM of CS :  R/L rot x 10 reps each   R/L SB x 10 reps   Rows with RTB  x 15  B sh ext with RTB x 15 Therex 17 min  Supine, sitting/standing c-spine arom all planes except flexion  5 to 10x 5 to 10 sec  Supine shoulder flexion with cane 10x 5 sec    Not today  Standing yellow sports cord rowing/ext 10x ea  Standing B anterior chest/shoulder stretch in doorway 5x 10 sec  Standing c-spine and scapulae retraction 5 to 10x 5 to 10 sec There ex 30 min  Reviewed HEP   supine, sitting/standing c-spine arom all planes except flexion  5 to 10x 5 to 10 sec  Standing against the wall shoulder flexion with cane 10x 5 sec      Standing yellow sports cord rowing/ext 10x ea  Standing B anterior chest/shoulder stretch in doorway 5x 10 sec  Standing c-spine and scapulae retraction 5 to 10x 5 to 10 sec  Reviewed proper sleeping posture with pt and , towel roll in pillow and tennis balls for self occipital release at home       Therex 12 min x1    Supine/sitting/standing c-spine arom all planes except flexion 5 to 10x 5 to 10 sec    Sitting/standing c-spine and B scapulae retraction 5 to 10x 5 ot 10 sec    Supine B UE reaching for feet with B scapulae retraction/depression 10x 10 sec    Supine B shoulder/scapulae retraction and depression-reaching for feet 10x 10 sec Seated :  R/L cervical SB  gentle stretch with belt assist x 10 reps each with 10 sec hold   Cervical AROM in all planes x 10 reps each   Seated :  Rows with YTB x 10  B sh ext with YTB x 10                 HEP: all ex to be performed at home     Charges: man x 2,there ex x2  Total Timed Treatment: 53 min    Total Treatment Time: 53 min

## 2023-09-21 RX ORDER — ATORVASTATIN CALCIUM 40 MG/1
40 TABLET, FILM COATED ORAL NIGHTLY
Qty: 90 TABLET | Refills: 0 | Status: SHIPPED | OUTPATIENT
Start: 2023-09-21

## 2023-09-21 NOTE — TELEPHONE ENCOUNTER
A refill request was received for:  Requested Prescriptions     Pending Prescriptions Disp Refills    ATORVASTATIN 40 MG Oral Tab [Pharmacy Med Name: Atorvastatin Calcium 40 Mg Tab Nort] 90 tablet 0     Sig: TAKE 1 TABLET BY MOUTH NIGHTLY         Component  Ref Range & Units 6/19/23  8:36 AM   Cholesterol, Total  <200 mg/dL 122   Comment: Desirable  <200 mg/dL  Borderline  200-239 mg/dL  High      >=240 mg/dL     HDL Cholesterol  40 - 59 mg/dL 44   Comment: Interpretive Information:  An HDL cholesterol <40 mg/dL is low and constitutes a coronary heart disease risk factor. An HDL cholesterol >60 mg/dL is a negative risk factor for coronary heart disease.      Triglycerides  30 - 149 mg/dL 137   Comment: Reference interval for fasting triglycerides  Desirable: <150 mg/dL  Borderline: 150-199 mg/dL  High: 200-499 mg/dL  Very High: >=500 mg/dL       LDL Cholesterol  <100 mg/dL 54   Comment: Desirable <100 mg/dL  Borderline 100-129 mg/dL  High     >=130mg/dL     VLDL  0 - 30 mg/dL 20   Non HDL Chol  <130 mg/dL 78          Last refill date:6/25/2023       Last office visit:6/21/2023     Follow up due:  Future Appointments   Date Time Provider Sabino Childs   11/1/2023  1:00 PM MINGO FRANCISCO SGIEDW None   11/1/2023  1:30 PM MINGO FRANCISCO SGMANASW None No

## 2023-10-09 DIAGNOSIS — K21.9 GASTROESOPHAGEAL REFLUX DISEASE WITHOUT ESOPHAGITIS: ICD-10-CM

## 2023-10-09 RX ORDER — OMEPRAZOLE 20 MG/1
20 CAPSULE, DELAYED RELEASE ORAL DAILY
Qty: 90 CAPSULE | Refills: 0 | Status: SHIPPED | OUTPATIENT
Start: 2023-10-09

## 2023-10-09 NOTE — TELEPHONE ENCOUNTER
.A refill request was received for:  Requested Prescriptions     Pending Prescriptions Disp Refills    OMEPRAZOLE 20 MG Oral Capsule Delayed Release [Pharmacy Med Name: Omeprazole Dr 20 Mg Cap Nort] 90 capsule 0     Sig: TAKE ONE CAPSULE BY MOUTH ONE TIME DAILY       Last refill date:   7/13/2023    Last office visit: 6/21/2023    Follow up due:  No future appointments.

## 2023-10-16 ENCOUNTER — TELEPHONE (OUTPATIENT)
Dept: FAMILY MEDICINE CLINIC | Facility: CLINIC | Age: 78
End: 2023-10-16

## 2023-10-30 ENCOUNTER — TELEPHONE (OUTPATIENT)
Dept: FAMILY MEDICINE CLINIC | Facility: CLINIC | Age: 78
End: 2023-10-30

## 2023-10-30 RX ORDER — AMLODIPINE BESYLATE 5 MG/1
5 TABLET ORAL DAILY
Qty: 90 TABLET | Refills: 0 | Status: SHIPPED | OUTPATIENT
Start: 2023-10-30

## 2023-10-30 NOTE — TELEPHONE ENCOUNTER
Pt wants to know if she's up-to-date on pneumonia vaccine ( also questioning, message to follow for him)

## 2023-12-06 ENCOUNTER — OFFICE VISIT (OUTPATIENT)
Dept: FAMILY MEDICINE CLINIC | Facility: CLINIC | Age: 78
End: 2023-12-06
Payer: MEDICARE

## 2023-12-06 VITALS
OXYGEN SATURATION: 95 % | DIASTOLIC BLOOD PRESSURE: 84 MMHG | BODY MASS INDEX: 25.35 KG/M2 | HEART RATE: 77 BPM | RESPIRATION RATE: 16 BRPM | HEIGHT: 61.5 IN | WEIGHT: 136 LBS | SYSTOLIC BLOOD PRESSURE: 126 MMHG

## 2023-12-06 DIAGNOSIS — M54.6 CHRONIC RIGHT-SIDED THORACIC BACK PAIN: Primary | ICD-10-CM

## 2023-12-06 DIAGNOSIS — G89.29 CHRONIC RIGHT-SIDED THORACIC BACK PAIN: Primary | ICD-10-CM

## 2023-12-06 PROCEDURE — 99213 OFFICE O/P EST LOW 20 MIN: CPT | Performed by: FAMILY MEDICINE

## 2023-12-06 RX ORDER — CYCLOBENZAPRINE HCL 5 MG
5 TABLET ORAL 3 TIMES DAILY PRN
Qty: 20 TABLET | Refills: 0 | Status: SHIPPED | OUTPATIENT
Start: 2023-12-06

## 2023-12-19 RX ORDER — ATORVASTATIN CALCIUM 40 MG/1
40 TABLET, FILM COATED ORAL NIGHTLY
Qty: 90 TABLET | Refills: 0 | Status: SHIPPED | OUTPATIENT
Start: 2023-12-19

## 2024-01-05 ENCOUNTER — TELEPHONE (OUTPATIENT)
Dept: FAMILY MEDICINE CLINIC | Facility: CLINIC | Age: 79
End: 2024-01-05

## 2024-01-05 DIAGNOSIS — K21.9 GASTROESOPHAGEAL REFLUX DISEASE WITHOUT ESOPHAGITIS: ICD-10-CM

## 2024-01-05 RX ORDER — OMEPRAZOLE 20 MG/1
20 CAPSULE, DELAYED RELEASE ORAL DAILY
Qty: 90 CAPSULE | Refills: 0 | Status: SHIPPED | OUTPATIENT
Start: 2024-01-05

## 2024-01-05 RX ORDER — AMLODIPINE BESYLATE 10 MG/1
10 TABLET ORAL DAILY
Qty: 30 TABLET | Refills: 0 | Status: SHIPPED | OUTPATIENT
Start: 2024-01-05

## 2024-01-05 NOTE — TELEPHONE ENCOUNTER
Spoke with patient, she states last week, through [facility], her BP was 'creeping up'  She states in August the Amlodipine was changed to 5mg.   BP was running 180/94, took hydralazine, helped a little- not much, so she doubled the dose, 10mg of Amlodipine.   Now for past 3 days and BP is improving, this morning readin/62, HR 60.   Last night HR was 94 but she had wine and was friends.   Not taking hydralazine any longer.   MARILOU, please see update regarding BP, she will need refill of amlodipine 10mg if that is what LE wants her on, she wants to know about HR of 60- is it too low?

## 2024-01-05 NOTE — TELEPHONE ENCOUNTER
Pt wants to speak with nurse regarding her BP.  It was \"all over the place\" mostly elevated.  She adjusted her dose and is good now.  She will need a med refill now.

## 2024-01-30 RX ORDER — AMLODIPINE BESYLATE 10 MG/1
10 TABLET ORAL DAILY
Qty: 30 TABLET | Refills: 0 | Status: SHIPPED | OUTPATIENT
Start: 2024-01-30

## 2024-02-14 ENCOUNTER — OFFICE VISIT (OUTPATIENT)
Dept: FAMILY MEDICINE CLINIC | Facility: CLINIC | Age: 79
End: 2024-02-14
Payer: MEDICARE

## 2024-02-14 VITALS
OXYGEN SATURATION: 98 % | HEIGHT: 61 IN | RESPIRATION RATE: 16 BRPM | DIASTOLIC BLOOD PRESSURE: 74 MMHG | BODY MASS INDEX: 25.68 KG/M2 | SYSTOLIC BLOOD PRESSURE: 116 MMHG | HEART RATE: 98 BPM | WEIGHT: 136 LBS

## 2024-02-14 DIAGNOSIS — Z13.820 SCREENING FOR OSTEOPOROSIS: ICD-10-CM

## 2024-02-14 DIAGNOSIS — Z78.0 ASYMPTOMATIC MENOPAUSE: ICD-10-CM

## 2024-02-14 DIAGNOSIS — E55.9 VITAMIN D DEFICIENCY: ICD-10-CM

## 2024-02-14 DIAGNOSIS — K21.9 GASTROESOPHAGEAL REFLUX DISEASE WITHOUT ESOPHAGITIS: ICD-10-CM

## 2024-02-14 DIAGNOSIS — N99.3 PROLAPSE, POST-HYSTERECTOMY: ICD-10-CM

## 2024-02-14 DIAGNOSIS — R92.8 ABNORMAL MAMMOGRAM: ICD-10-CM

## 2024-02-14 DIAGNOSIS — I73.9 PAD (PERIPHERAL ARTERY DISEASE) (HCC): ICD-10-CM

## 2024-02-14 DIAGNOSIS — I10 ESSENTIAL HYPERTENSION: ICD-10-CM

## 2024-02-14 DIAGNOSIS — K44.9 HIATAL HERNIA: ICD-10-CM

## 2024-02-14 DIAGNOSIS — K57.90 DIVERTICULOSIS OF INTESTINE WITHOUT BLEEDING, UNSPECIFIED INTESTINAL TRACT LOCATION: ICD-10-CM

## 2024-02-14 DIAGNOSIS — Z00.00 ENCOUNTER FOR ANNUAL HEALTH EXAMINATION: Primary | ICD-10-CM

## 2024-02-14 DIAGNOSIS — M81.8 AGE-RELATED OSTEOPOROSIS WITHOUT FRACTURE: ICD-10-CM

## 2024-02-14 DIAGNOSIS — I74.5 ILIAC ARTERY OCCLUSION (HCC): ICD-10-CM

## 2024-02-14 DIAGNOSIS — I70.0 AORTIC ATHEROSCLEROSIS (HCC): ICD-10-CM

## 2024-02-14 PROBLEM — N81.10 PELVIC ORGAN PROLAPSE QUANTIFICATION STAGE 3 CYSTOCELE: Status: RESOLVED | Noted: 2021-06-22 | Resolved: 2024-02-14

## 2024-02-14 PROCEDURE — G0439 PPPS, SUBSEQ VISIT: HCPCS | Performed by: FAMILY MEDICINE

## 2024-02-14 PROCEDURE — 99214 OFFICE O/P EST MOD 30 MIN: CPT | Performed by: FAMILY MEDICINE

## 2024-02-14 RX ORDER — OMEPRAZOLE 20 MG/1
20 CAPSULE, DELAYED RELEASE ORAL DAILY
Qty: 90 CAPSULE | Refills: 1 | Status: SHIPPED | OUTPATIENT
Start: 2024-02-14

## 2024-02-14 RX ORDER — AMLODIPINE BESYLATE 10 MG/1
10 TABLET ORAL DAILY
Qty: 90 TABLET | Refills: 1 | Status: SHIPPED | OUTPATIENT
Start: 2024-02-14

## 2024-02-14 RX ORDER — ATORVASTATIN CALCIUM 40 MG/1
40 TABLET, FILM COATED ORAL NIGHTLY
Qty: 90 TABLET | Refills: 1 | Status: SHIPPED | OUTPATIENT
Start: 2024-02-14

## 2024-02-14 NOTE — PROGRESS NOTES
Subjective:   Stefani Todd is a 78 year old female who presents for a Medicare Subsequent Annual Wellness visit (Pt already had Initial Annual Wellness) and scheduled follow up of multiple significant but stable problems.   Pt also here with    Gastroesophageal reflux disease without esophagitis    Essential hypertension    Age-related osteoporosis without fracture    Prolapse, post-hysterectomy, full apical prolapse    Iliac artery occlusion (HCC)    Hiatal hernia    Diverticulosis of intestine without bleeding    Aortic atherosclerosis (HCC)    PAD (peripheral artery disease) ([facility])       History/Other:   Fall Risk Assessment:   She has been screened for Falls and is High Risk. Fall Prevention information provided to patient in After Visit Summary.    Do you feel unsteady when standing or walking?: Yes  Do you worry about falling?: Yes  Have you fallen in the past year?: No     Cognitive Assessment:   Abnormal  What day of the week is this?: Correct  What month is it?: Correct  What year is it?: Correct  Recall \"Ball\": Correct  Recall \"Flag\": Correct  Recall \"Tree\": Incorrect    Functional Ability/Status:   Stefani Todd has some abnormal functions as listed below:  She has Vision problems based on screening of functional status.       Depression Screening (PHQ-2/PHQ-9): PHQ-2 SCORE: 0  , done 2/14/2024        Advanced Directives:   She does have a Living Will but we do NOT have it on file in Epic.    She does have a POA but we do NOT have it on file in Epic.    Discussed Advance Care Planning with patient (and family/surrogate if present). Standard forms made available to patient in After Visit Summary.      Patient Active Problem List   Diagnosis    Gastroesophageal reflux disease without esophagitis- stable     Essential hypertension- stable     Age-related osteoporosis without fracture- stable     Prolapse, post-hysterectomy, full apical prolapse- stable     Iliac artery occlusion (HCC)- stable     Hiatal  hernia- stable     Diverticulosis of intestine without bleeding- stable     Aortic atherosclerosis (HCC)- stable     PAD (peripheral artery disease) ([facility])- stable      Allergies:  She has No Known Allergies.    Current Medications:  Outpatient Medications Marked as Taking for the 2/14/24 encounter (Office Visit) with Pat Roa, DO   Medication Sig    amLODIPine 10 MG Oral Tab Take 1 tablet (10 mg total) by mouth daily.    atorvastatin 40 MG Oral Tab Take 1 tablet (40 mg total) by mouth nightly.    omeprazole 20 MG Oral Capsule Delayed Release Take 1 capsule (20 mg total) by mouth daily.    metoprolol tartrate 25 MG Oral Tab Take 1 tablet (25 mg total) by mouth at bedtime.    cyclobenzaprine 5 MG Oral Tab Take 1 tablet (5 mg total) by mouth 3 (three) times daily as needed for Muscle spasms.    Irbesartan 150 MG Oral Tab Take 1 tablet (150 mg total) by mouth daily.    PROLENSA 0.07 % Ophthalmic Solution INSTILL ONE DROP IN THE RIGHT EYE AND TWO DROPS IN THE LEFT EYE.    hydrALAZINE 10 MG Oral Tab Take 1 tablet (10 mg total) by mouth 4 (four) times daily as needed.    Denosumab 60 MG/ML Subcutaneous Solution Prefilled Syringe Inject 1 mL (60 mg total) into the skin once. Historical documentation - see Epic Immunization Activity for administration details    aspirin 81 MG Oral Tab Take 1 tablet (81 mg total) by mouth daily.    Cholecalciferol (VITAMIN D) 1000 UNITS Oral Tab Take 1 tablet by mouth daily.       Medical History:  She  has a past medical history of Abdominal hernia, Abdominal pain, Age-related osteoporosis without fracture, Anxiety, Back problem, Black stools, Blurred vision, Constipation, Fracture of olecranon process of right ulna, closed, initial encounter (07/07/2015), Herpes zoster without mention of complication, High blood pressure, High cholesterol, Leaking of urine, Occlusion and stenosis of carotid artery without mention of cerebral infarction, Pain with bowel movements, Prolapse,  post-hysterectomy, Shortness of breath, and Wears glasses.  Surgical History:  She  has a past surgical history that includes ; other; hysterectomy (mid ); tonsillectomy; cyst aspiration left (Left, ?); cyst aspiration right (Right, ?); and oophorectomy (Bilateral, mid ).   Family History:  Her family history includes CHF in her mother; Heart Attack in her mother; Heart Disorder in her mother; Hypertension in her mother; MVC (age of onset: 36) in her father.  Social History:  She  reports that she has never smoked. She has never used smokeless tobacco. She reports current alcohol use of about 8.3 standard drinks of alcohol per week. She reports that she does not use drugs.    Tobacco:  She has never smoked tobacco.    CAGE Alcohol Screen:   CAGE screening score of 0 on 2024, showing low risk of alcohol abuse.    Patient Care Team:  Pat Roa DO as PCP - General (Family Medicine)  Romy Hawkins PT as Physical Therapist  Angelita Lee PTA as Physical Therapy Assistant (Physical Therapy)    Review of Systems       Objective:   Physical Exam  General Appearance:  Alert, cooperative, no distress, appears stated age   Head:  Normocephalic, without obvious abnormality, atraumatic   Eyes:  PERRL, conjunctiva/corneas clear, EOM's intact both eyes   Ears:  Normal TM's and external ear canals, both ears   Nose: Nares normal, septum midline,mucosa normal, no drainage or sinus tenderness   Throat: Lips, mucosa, and tongue normal; teeth and gums normal   Neck: Supple, symmetrical, trachea midline, no adenopathy;  thyroid: not enlarged, symmetric, no tenderness/mass/nodules; no carotid bruit or JVD   Back:   Symmetric, no curvature, ROM normal, no CVA tenderness   Lungs:   Clear to auscultation bilaterally, respirations unlabored   Heart:  Regular rate and rhythm, S1 and S2 normal, no murmur, rub, or gallop   Abdomen:   Soft, non-tender, bowel sounds active all four quadrants,  no masses, no  organomegaly   Pelvic: Deferred   Extremities: Extremities normal, atraumatic, no cyanosis or edema   Pulses: 2+ and symmetric   Skin: Skin color, texture, turgor normal, no rashes or lesions   Lymph nodes: Cervical, supraclavicular, and axillary nodes normal   Neurologic: Normal       /74   Pulse 98   Resp 16   Ht 5' 1\" (1.549 m)   Wt 136 lb (61.7 kg)   SpO2 98%   BMI 25.70 kg/m²  Estimated body mass index is 25.7 kg/m² as calculated from the following:    Height as of this encounter: 5' 1\" (1.549 m).    Weight as of this encounter: 136 lb (61.7 kg).    Medicare Hearing Assessment: whispered voice     Visual Acuity:   Right Eye Visual Acuity: Corrected Right Eye Chart Acuity: 20/60   Left Eye Visual Acuity: Corrected Left Eye Chart Acuity: 20/60   Both Eyes Visual Acuity: Corrected Both Eyes Chart Acuity: 20/60   Able To Tolerate Visual Acuity: Yes        Assessment & Plan:   Stefani Todd is a 78 year old female who presents for a Medicare Assessment.     1. Encounter for annual health examination (Primary)  2. Aortic atherosclerosis (HCC)- stable cpm   -     Lipid Panel; Future; Expected date: 02/14/2024  -     Atorvastatin Calcium; Take 1 tablet (40 mg total) by mouth nightly.  Dispense: 90 tablet; Refill: 1  -     Detailed, Mod Complex (18288)  3. Iliac artery occlusion (HCC)- see cardiology   -     Lipid Panel; Future; Expected date: 02/14/2024  -     Atorvastatin Calcium; Take 1 tablet (40 mg total) by mouth nightly.  Dispense: 90 tablet; Refill: 1  -     Detailed, Mod Complex (13666)  4. PAD (peripheral artery disease) (HCC)- see cardiology   -     Lipid Panel; Future; Expected date: 02/14/2024  -     Atorvastatin Calcium; Take 1 tablet (40 mg total) by mouth nightly.  Dispense: 90 tablet; Refill: 1  -     Detailed, Mod Complex (05451)  5. Essential hypertension- add metoprolol to bedtime  -     Free T4, (Free Thyroxine); Future; Expected date: 02/14/2024  -     Assay, Thyroid Stim Hormone;  Future; Expected date: 02/14/2024  -     amLODIPine Besylate; Take 1 tablet (10 mg total) by mouth daily.  Dispense: 90 tablet; Refill: 1  -     Metoprolol Tartrate; Take 1 tablet (25 mg total) by mouth at bedtime.  Dispense: 30 tablet; Refill: 0  -     Detailed, Mod Complex (00598)  6. Age-related osteoporosis without fracture- on prolia   -     Detailed, Mod Complex (57484)  7. Diverticulosis of intestine without bleeding, unspecified intestinal tract location- stable monitor   -     Detailed, Mod Complex (67303)  8. Gastroesophageal reflux disease without esophagitis- stable monitor   -     CBC With Differential With Platelet; Future; Expected date: 02/14/2024  -     Vitamin B12; Future; Expected date: 02/14/2024  -     Comp Metabolic Panel (14); Future; Expected date: 02/14/2024  -     Magnesium; Future; Expected date: 02/14/2024  -     Omeprazole; Take 1 capsule (20 mg total) by mouth daily.  Dispense: 90 capsule; Refill: 1  -     Detailed, Mod Complex (19259)  9. Hiatal hernia- stable monitor   -     Omeprazole; Take 1 capsule (20 mg total) by mouth daily.  Dispense: 90 capsule; Refill: 1  -     Detailed, Mod Complex (39093)  10. Prolapse, post-hysterectomy, full apical prolapse- stable monitor   -     Detailed, Mod Complex (81487)  11. Vitamin D deficiency- stable monitor   -     Vitamin D; Future; Expected date: 02/14/2024  -     Detailed, Mod Complex (79298)  12. Abnormal mammogram  -     [facility] GOPAL 2D+3D DIAGNOSTIC [facility]  BILAT (CPT=77066/86244); Future; Expected date: 02/14/2024  13. Asymptomatic menopause  -     XR DEXA BONE DENSITOMETRY (CPT=77080); Future; Expected date: 02/14/2024  14. Screening for osteoporosis  -     XR DEXA BONE DENSITOMETRY (CPT=77080); Future; Expected date: 02/14/2024    The patient indicates understanding of these issues and agrees to the plan.  Reinforced healthy diet, lifestyle, and exercise.    Return in 6 months (on 8/14/2024).     Pat Roa DO, 2/14/2024     Supplementary  Documentation:   General Health:  In the past six months, have you lost more than 10 pounds without trying?: 1 - Yes  Has your appetite been poor?: No  Type of Diet: Balanced  How does the patient maintain a good energy level?: Appropriate Exercise  How would you describe your daily physical activity?: Moderate  How would you describe your current health state?: Good  How do you maintain positive mental well-being?: Social Interaction;Visiting Family;Puzzles;Games;Visiting Friends  On a scale of 0 to 10, with 0 being no pain and 10 being severe pain, what is your pain level?: 0 - (None)  In the past six months, have you experienced urine leakage?: 0-No  At any time do you feel concerned for the safety/well-being of yourself and/or your children, in your home or elsewhere?: No  Have you had any immunizations at another office such as Influenza, Hepatitis B, Tetanus, or Pneumococcal?: No         Stefani Todd's SCREENING SCHEDULE   Tests on this list are recommended by your physician but may not be covered, or covered at this frequency, by your insurer.   Please check with your insurance carrier before scheduling to verify coverage.   PREVENTATIVE SERVICES FREQUENCY &  COVERAGE DETAILS LAST COMPLETION DATE   Diabetes Screening    Fasting Blood Sugar /  Glucose    One screening every 12 months if never tested or if previously tested but not diagnosed with pre-diabetes   One screening every 6 months if diagnosed with pre-diabetes Lab Results   Component Value Date    GLU 97 06/19/2023        Cardiovascular Disease Screening    Lipid Panel  Cholesterol  Lipoprotein (HDL)  Triglycerides Covered every 5 years for all Medicare beneficiaries without apparent signs or symptoms of cardiovascular disease Lab Results   Component Value Date    CHOLEST 122 06/19/2023    HDL 44 06/19/2023    LDL 54 06/19/2023    TRIG 137 06/19/2023         Electrocardiogram (EKG)   Covered if needed at Welcome to Medicare, and non-screening if  indicated for medical reasons 11/25/2022      Ultrasound Screening for Abdominal Aortic Aneurysm (AAA) Covered once in a lifetime for one of the following risk factors    Men who are 65-75 years old and have ever smoked    Anyone with a family history -     Colorectal Cancer Screening  Covered for ages 50-85; only need ONE of the following:    Colonoscopy   Covered every 10 years    Covered every 2 years if patient is at high risk or previous colonoscopy was abnormal -    No recommendations at this time    Flexible Sigmoidoscopy   Covered every 4 years -    Fecal Occult Blood Test Covered annually -   Bone Density Screening    Bone density screening    Covered every 2 years after age 65 if diagnosed with risk of osteoporosis or estrogen deficiency.    Covered yearly for long-term glucocorticoid medication use (Steroids) Last Dexa Scan:    XR DEXA BONE DENSITOMETRY (CPT=77080) 12/05/2022      No recommendations at this time   Pap and Pelvic    Pap   Covered every 2 years for women at normal risk; Annually if at high risk -  No recommendations at this time    Chlamydia Annually if high risk -  No recommendations at this time   Screening Mammogram    Mammogram     Recommend annually for all female patients aged 40 and older    One baseline mammogram covered for patients aged 35-39 04/19/2022    No recommendations at this time    Immunizations    Influenza Covered once per flu season  Please get every year 10/10/2023  No recommendations at this time    Pneumococcal Each vaccine ([id number] & [id number]) covered once after 65 Prevnar 13: 04/07/2016    Iemrjawgo16: 02/02/2018     No recommendations at this time    Hepatitis B One screening covered for patients with certain risk factors   -  No recommendations at this time    Tetanus Toxoid Not covered by Medicare Part B unless medically necessary (cut with metal); may be covered with your pharmacy prescription benefits -    Tetanus, Diptheria and Pertusis TD and TDaP Not  covered by Medicare Part B -  No recommendations at this time    Zoster Not covered by Medicare Part B; may be covered with your pharmacy  prescription benefits 08/15/2011  No recommendations at this time     Annual Monitoring of Persistent Medications (ACE/ARB, digoxin diuretics, anticonvulsants)    Potassium Annually Lab Results   Component Value Date    K 4.1 06/19/2023         Creatinine   Annually Lab Results   Component Value Date    CREATSERUM 0.65 06/19/2023         BUN Annually Lab Results   Component Value Date    BUN 18 06/19/2023       Drug Serum Conc Annually No results found for: \"DIGOXIN\", \"DIG\", \"VALP\"

## 2024-02-14 NOTE — PATIENT INSTRUCTIONS
Stefani Todd's SCREENING SCHEDULE   Tests on this list are recommended by your physician but may not be covered, or covered at this frequency, by your insurer.   Please check with your insurance carrier before scheduling to verify coverage.   PREVENTATIVE SERVICES FREQUENCY &  COVERAGE DETAILS LAST COMPLETION DATE   Diabetes Screening    Fasting Blood Sugar /  Glucose    One screening every 12 months if never tested or if previously tested but not diagnosed with pre-diabetes   One screening every 6 months if diagnosed with pre-diabetes Lab Results   Component Value Date    GLU 97 06/19/2023        Cardiovascular Disease Screening    Lipid Panel  Cholesterol  Lipoprotein (HDL)  Triglycerides Covered every 5 years for all Medicare beneficiaries without apparent signs or symptoms of cardiovascular disease Lab Results   Component Value Date    CHOLEST 122 06/19/2023    HDL 44 06/19/2023    LDL 54 06/19/2023    TRIG 137 06/19/2023         Electrocardiogram (EKG)   Covered if needed at Welcome to Medicare, and non-screening if indicated for medical reasons 11/25/2022      Ultrasound Screening for Abdominal Aortic Aneurysm (AAA) Covered once in a lifetime for one of the following risk factors   • Men who are 65-75 years old and have ever smoked   • Anyone with a family history -     Colorectal Cancer Screening  Covered for ages 50-85; only need ONE of the following:    Colonoscopy   Covered every 10 years    Covered every 2 years if patient is at high risk or previous colonoscopy was abnormal -    No recommendations at this time    Flexible Sigmoidoscopy   Covered every 4 years -    Fecal Occult Blood Test Covered annually -   Bone Density Screening    Bone density screening    Covered every 2 years after age 65 if diagnosed with risk of osteoporosis or estrogen deficiency.    Covered yearly for long-term glucocorticoid medication use (Steroids) Last Dexa Scan:    XR DEXA BONE DENSITOMETRY (CPT=77080) 12/05/2022      No  recommendations at this time   Pap and Pelvic    Pap   Covered every 2 years for women at normal risk; Annually if at high risk -  No recommendations at this time    Chlamydia Annually if high risk -  No recommendations at this time   Screening Mammogram    Mammogram     Recommend annually for all female patients aged 40 and older    One baseline mammogram covered for patients aged 35-39 04/19/2022    No recommendations at this time    Immunizations    Influenza Covered once per flu season  Please get every year 10/10/2023  No recommendations at this time    Pneumococcal Each vaccine ([id number] & [id number]) covered once after 65 Prevnar 13: 04/07/2016    Ywkvkpats06: 02/02/2018     No recommendations at this time    Hepatitis B One screening covered for patients with certain risk factors   -  No recommendations at this time    Tetanus Toxoid Not covered by Medicare Part B unless medically necessary (cut with metal); may be covered with your pharmacy prescription benefits -    Tetanus, Diptheria and Pertusis TD and TDaP Not covered by Medicare Part B -  No recommendations at this time    Zoster Not covered by Medicare Part B; may be covered with your pharmacy  prescription benefits 08/15/2011  No recommendations at this time     Annual Monitoring of Persistent Medications (ACE/ARB, digoxin diuretics, anticonvulsants)    Potassium Annually Lab Results   Component Value Date    K 4.1 06/19/2023         Creatinine   Annually Lab Results   Component Value Date    CREATSERUM 0.65 06/19/2023         BUN Annually Lab Results   Component Value Date    BUN 18 06/19/2023       Drug Serum Conc Annually No results found for: \"DIGOXIN\", \"DIG\", \"VALP\"

## 2024-09-16 DIAGNOSIS — I70.0 AORTIC ATHEROSCLEROSIS (HCC): ICD-10-CM

## 2024-09-16 DIAGNOSIS — I73.9 PAD (PERIPHERAL ARTERY DISEASE) (HCC): ICD-10-CM

## 2024-09-16 DIAGNOSIS — I74.5 ILIAC ARTERY OCCLUSION (HCC): ICD-10-CM

## 2024-09-16 RX ORDER — ATORVASTATIN CALCIUM 40 MG/1
40 TABLET, FILM COATED ORAL NIGHTLY
Qty: 30 TABLET | Refills: 0 | Status: SHIPPED | OUTPATIENT
Start: 2024-09-16

## 2024-09-16 NOTE — TELEPHONE ENCOUNTER
A refill request was received for:  Requested Prescriptions     Pending Prescriptions Disp Refills    ATORVASTATIN 40 MG Oral Tab [Pharmacy Med Name: Atorvastatin Calcium 40 Mg Tab Nort] 90 tablet 0     Sig: Take 1 tablet by mouth nightly.       Last refill date:2/14/2024     No Lipid done in last 12 months. Mary raza sent to patient for appt and labs  Last office visit:2/14/2024     Follow up due:  No future appointments.

## 2024-09-25 DIAGNOSIS — I10 ESSENTIAL HYPERTENSION: ICD-10-CM

## 2024-09-27 RX ORDER — AMLODIPINE BESYLATE 10 MG/1
10 TABLET ORAL DAILY
Qty: 30 TABLET | Refills: 0 | Status: SHIPPED | OUTPATIENT
Start: 2024-09-27

## 2024-09-27 NOTE — TELEPHONE ENCOUNTER
.A refill request was received for:  Requested Prescriptions     Pending Prescriptions Disp Refills    AMLODIPINE 10 MG Oral Tab [Pharmacy Med Name: Amlodipine Besylate 10 Mg Tab Unic] 90 tablet 0     Sig: TAKE ONE TABLET BY MOUTH ONE TIME DAILY       Last refill date:   2/14/2024    Last office visit: 2/14/2024    Follow up due:  No future appointments.

## 2024-09-30 DIAGNOSIS — I10 ESSENTIAL HYPERTENSION: ICD-10-CM

## 2024-09-30 RX ORDER — AMLODIPINE BESYLATE 10 MG/1
10 TABLET ORAL DAILY
Qty: 90 TABLET | Refills: 0 | OUTPATIENT
Start: 2024-09-30

## 2024-10-06 DIAGNOSIS — K21.9 GASTROESOPHAGEAL REFLUX DISEASE WITHOUT ESOPHAGITIS: ICD-10-CM

## 2024-10-06 DIAGNOSIS — K44.9 HIATAL HERNIA: ICD-10-CM

## 2024-10-17 DIAGNOSIS — I74.5 ILIAC ARTERY OCCLUSION (HCC): ICD-10-CM

## 2024-10-17 DIAGNOSIS — I73.9 PAD (PERIPHERAL ARTERY DISEASE) (HCC): ICD-10-CM

## 2024-10-17 DIAGNOSIS — I70.0 AORTIC ATHEROSCLEROSIS (HCC): ICD-10-CM

## 2024-10-18 RX ORDER — ATORVASTATIN CALCIUM 40 MG/1
40 TABLET, FILM COATED ORAL NIGHTLY
Qty: 30 TABLET | Refills: 0 | OUTPATIENT
Start: 2024-10-18

## 2024-10-18 NOTE — TELEPHONE ENCOUNTER
A refill request was received for:  Requested Prescriptions     Pending Prescriptions Disp Refills    ATORVASTATIN 40 MG Oral Tab [Pharmacy Med Name: Atorvastatin Calcium 40 Mg Tab Nort] 30 tablet 0     Sig: Take 1 tablet (40 mg total) by mouth nightly.       Last refill date:   09/16/2024    Last office visit: 02/14/2024    Follow up due:  No future appointments.

## 2024-10-21 DIAGNOSIS — I70.0 AORTIC ATHEROSCLEROSIS (HCC): ICD-10-CM

## 2024-10-21 DIAGNOSIS — I73.9 PAD (PERIPHERAL ARTERY DISEASE) (HCC): ICD-10-CM

## 2024-10-21 DIAGNOSIS — I74.5 ILIAC ARTERY OCCLUSION (HCC): ICD-10-CM

## 2024-10-22 NOTE — TELEPHONE ENCOUNTER
.A refill request was received for:  Requested Prescriptions     Pending Prescriptions Disp Refills    ATORVASTATIN 40 MG Oral Tab [Pharmacy Med Name: Atorvastatin Calcium 40 Mg Tab Nort] 30 tablet 0     Sig: Take 1 tablet (40 mg total) by mouth nightly.       Last refill date:   9/16/24    Last office visit: 2/14/24    Follow up due:  No future appointments.

## 2024-10-23 RX ORDER — ATORVASTATIN CALCIUM 40 MG/1
40 TABLET, FILM COATED ORAL NIGHTLY
Qty: 30 TABLET | Refills: 0 | Status: SHIPPED | OUTPATIENT
Start: 2024-10-23

## 2024-10-25 DIAGNOSIS — I10 ESSENTIAL HYPERTENSION: ICD-10-CM

## 2024-10-25 RX ORDER — AMLODIPINE BESYLATE 10 MG/1
10 TABLET ORAL DAILY
Qty: 30 TABLET | Refills: 0 | Status: SHIPPED | OUTPATIENT
Start: 2024-10-25

## 2024-10-25 NOTE — TELEPHONE ENCOUNTER
A refill request was received for:  Requested Prescriptions     Pending Prescriptions Disp Refills    AMLODIPINE 10 MG Oral Tab [Pharmacy Med Name: Amlodipine Besylate 10 Mg Tab Unic] 30 tablet 0     Sig: Take 1 tablet (10 mg total) by mouth daily.       Last refill date:  09/27/24     Last office visit:01/14/24       No future appointments.

## 2024-11-18 DIAGNOSIS — I74.5 ILIAC ARTERY OCCLUSION (HCC): ICD-10-CM

## 2024-11-18 DIAGNOSIS — I73.9 PAD (PERIPHERAL ARTERY DISEASE) (HCC): ICD-10-CM

## 2024-11-18 DIAGNOSIS — I10 ESSENTIAL HYPERTENSION: ICD-10-CM

## 2024-11-18 DIAGNOSIS — I70.0 AORTIC ATHEROSCLEROSIS (HCC): ICD-10-CM

## 2024-11-18 NOTE — TELEPHONE ENCOUNTER
A refill request was received for:  Requested Prescriptions     Pending Prescriptions Disp Refills    ATORVASTATIN 40 MG Oral Tab [Pharmacy Med Name: Atorvastatin Calcium 40 Mg Tab Nort] 30 tablet 0     Sig: TAKE ONE TABLET BY MOUTH NIGHTLY    AMLODIPINE 10 MG Oral Tab [Pharmacy Med Name: Amlodipine Besylate 10 Mg Tab Unic] 30 tablet 0     Sig: Take 1 tablet by mouth daily.       Last refill date:   10/23/2024    Last office visit: 02/14/2024    Follow up due:  No future appointments.

## 2024-11-19 RX ORDER — ATORVASTATIN CALCIUM 40 MG/1
40 TABLET, FILM COATED ORAL NIGHTLY
Qty: 10 TABLET | Refills: 0 | Status: SHIPPED | OUTPATIENT
Start: 2024-11-19 | End: 2024-11-25

## 2024-11-19 RX ORDER — AMLODIPINE BESYLATE 10 MG/1
10 TABLET ORAL DAILY
Qty: 10 TABLET | Refills: 0 | Status: SHIPPED | OUTPATIENT
Start: 2024-11-19

## 2024-11-20 NOTE — TELEPHONE ENCOUNTER
Please call pt   She has not had labs in almost 1.5 years   She is due for her physical soon as well

## 2024-11-21 ENCOUNTER — LAB ENCOUNTER (OUTPATIENT)
Dept: LAB | Age: 79
End: 2024-11-21
Attending: FAMILY MEDICINE
Payer: MEDICARE

## 2024-11-21 ENCOUNTER — NURSE ONLY (OUTPATIENT)
Dept: FAMILY MEDICINE CLINIC | Facility: CLINIC | Age: 79
End: 2024-11-21
Payer: MEDICARE

## 2024-11-21 DIAGNOSIS — I10 ESSENTIAL HYPERTENSION: ICD-10-CM

## 2024-11-21 DIAGNOSIS — I74.5 ILIAC ARTERY OCCLUSION (HCC): ICD-10-CM

## 2024-11-21 DIAGNOSIS — K21.9 GASTROESOPHAGEAL REFLUX DISEASE WITHOUT ESOPHAGITIS: ICD-10-CM

## 2024-11-21 DIAGNOSIS — E55.9 VITAMIN D DEFICIENCY: ICD-10-CM

## 2024-11-21 DIAGNOSIS — I73.9 PAD (PERIPHERAL ARTERY DISEASE) (HCC): ICD-10-CM

## 2024-11-21 DIAGNOSIS — K21.9 GASTROESOPHAGEAL REFLUX DISEASE WITHOUT ESOPHAGITIS: Primary | ICD-10-CM

## 2024-11-21 DIAGNOSIS — I70.0 AORTIC ATHEROSCLEROSIS (HCC): ICD-10-CM

## 2024-11-21 LAB
ALBUMIN SERPL-MCNC: 4.7 G/DL (ref 3.2–4.8)
ALBUMIN/GLOB SERPL: 1.9 {RATIO} (ref 1–2)
ALP LIVER SERPL-CCNC: 93 U/L
ALT SERPL-CCNC: 17 U/L
ANION GAP SERPL CALC-SCNC: 7 MMOL/L (ref 0–18)
AST SERPL-CCNC: 20 U/L (ref ?–34)
BASOPHILS # BLD AUTO: 0.05 X10(3) UL (ref 0–0.2)
BASOPHILS NFR BLD AUTO: 0.9 %
BILIRUB SERPL-MCNC: 0.6 MG/DL (ref 0.2–1.1)
BUN BLD-MCNC: 17 MG/DL (ref 9–23)
CALCIUM BLD-MCNC: 10.1 MG/DL (ref 8.7–10.4)
CHLORIDE SERPL-SCNC: 106 MMOL/L (ref 98–112)
CHOLEST SERPL-MCNC: 121 MG/DL (ref ?–200)
CO2 SERPL-SCNC: 28 MMOL/L (ref 21–32)
CREAT BLD-MCNC: 0.64 MG/DL
EGFRCR SERPLBLD CKD-EPI 2021: 89 ML/MIN/1.73M2 (ref 60–?)
EOSINOPHIL # BLD AUTO: 0.28 X10(3) UL (ref 0–0.7)
EOSINOPHIL NFR BLD AUTO: 5.1 %
ERYTHROCYTE [DISTWIDTH] IN BLOOD BY AUTOMATED COUNT: 13.8 %
FASTING PATIENT LIPID ANSWER: YES
FASTING STATUS PATIENT QL REPORTED: YES
GLOBULIN PLAS-MCNC: 2.5 G/DL (ref 2–3.5)
GLUCOSE BLD-MCNC: 87 MG/DL (ref 70–99)
HCT VFR BLD AUTO: 45.1 %
HDLC SERPL-MCNC: 48 MG/DL (ref 40–59)
HGB BLD-MCNC: 14.5 G/DL
IMM GRANULOCYTES # BLD AUTO: 0.03 X10(3) UL (ref 0–1)
IMM GRANULOCYTES NFR BLD: 0.5 %
LDLC SERPL CALC-MCNC: 57 MG/DL (ref ?–100)
LYMPHOCYTES # BLD AUTO: 1.42 X10(3) UL (ref 1–4)
LYMPHOCYTES NFR BLD AUTO: 26 %
MAGNESIUM SERPL-MCNC: 2.1 MG/DL (ref 1.6–2.6)
MCH RBC QN AUTO: 30.1 PG (ref 26–34)
MCHC RBC AUTO-ENTMCNC: 32.2 G/DL (ref 31–37)
MCV RBC AUTO: 93.6 FL
MONOCYTES # BLD AUTO: 0.72 X10(3) UL (ref 0.1–1)
MONOCYTES NFR BLD AUTO: 13.2 %
NEUTROPHILS # BLD AUTO: 2.96 X10 (3) UL (ref 1.5–7.7)
NEUTROPHILS # BLD AUTO: 2.96 X10(3) UL (ref 1.5–7.7)
NEUTROPHILS NFR BLD AUTO: 54.3 %
NONHDLC SERPL-MCNC: 73 MG/DL (ref ?–130)
OSMOLALITY SERPL CALC.SUM OF ELEC: 293 MOSM/KG (ref 275–295)
PLATELET # BLD AUTO: 387 10(3)UL (ref 150–450)
POTASSIUM SERPL-SCNC: 4.2 MMOL/L (ref 3.5–5.1)
PROT SERPL-MCNC: 7.2 G/DL (ref 5.7–8.2)
RBC # BLD AUTO: 4.82 X10(6)UL
SODIUM SERPL-SCNC: 141 MMOL/L (ref 136–145)
T4 FREE SERPL-MCNC: 1.4 NG/DL (ref 0.8–1.7)
TRIGL SERPL-MCNC: 82 MG/DL (ref 30–149)
TSI SER-ACNC: 2.41 UIU/ML (ref 0.55–4.78)
VIT D+METAB SERPL-MCNC: 38.5 NG/ML (ref 30–100)
VLDLC SERPL CALC-MCNC: 12 MG/DL (ref 0–30)
WBC # BLD AUTO: 5.5 X10(3) UL (ref 4–11)

## 2024-11-21 PROCEDURE — 85025 COMPLETE CBC W/AUTO DIFF WBC: CPT

## 2024-11-21 PROCEDURE — 80053 COMPREHEN METABOLIC PANEL: CPT

## 2024-11-21 PROCEDURE — 36415 COLL VENOUS BLD VENIPUNCTURE: CPT

## 2024-11-21 PROCEDURE — 83735 ASSAY OF MAGNESIUM: CPT

## 2024-11-21 PROCEDURE — 84439 ASSAY OF FREE THYROXINE: CPT

## 2024-11-21 PROCEDURE — 84443 ASSAY THYROID STIM HORMONE: CPT

## 2024-11-21 PROCEDURE — 90662 IIV NO PRSV INCREASED AG IM: CPT | Performed by: FAMILY MEDICINE

## 2024-11-21 PROCEDURE — 82306 VITAMIN D 25 HYDROXY: CPT

## 2024-11-21 PROCEDURE — 80061 LIPID PANEL: CPT

## 2024-11-21 PROCEDURE — G0008 ADMIN INFLUENZA VIRUS VAC: HCPCS | Performed by: FAMILY MEDICINE

## 2024-11-25 ENCOUNTER — TELEPHONE (OUTPATIENT)
Dept: FAMILY MEDICINE CLINIC | Facility: CLINIC | Age: 79
End: 2024-11-25

## 2024-11-25 DIAGNOSIS — I73.9 PAD (PERIPHERAL ARTERY DISEASE) (HCC): ICD-10-CM

## 2024-11-25 DIAGNOSIS — I74.5 ILIAC ARTERY OCCLUSION (HCC): ICD-10-CM

## 2024-11-25 DIAGNOSIS — I70.0 AORTIC ATHEROSCLEROSIS (HCC): ICD-10-CM

## 2024-11-25 RX ORDER — ATORVASTATIN CALCIUM 40 MG/1
40 TABLET, FILM COATED ORAL NIGHTLY
Qty: 90 TABLET | Refills: 1 | Status: SHIPPED | OUTPATIENT
Start: 2024-11-25

## 2024-11-25 RX ORDER — ATORVASTATIN CALCIUM 40 MG/1
40 TABLET, FILM COATED ORAL NIGHTLY
Qty: 10 TABLET | Refills: 0 | Status: SHIPPED | OUTPATIENT
Start: 2024-11-25 | End: 2024-11-25

## 2024-11-25 NOTE — TELEPHONE ENCOUNTER
ATORVASTATIN 40 MG Oral Tab    Pt had her labs done on 11/21.  She needs 90 day refill.  They sent only 10 pills today.  Her insurance won't cover the 10

## 2024-11-28 DIAGNOSIS — I10 ESSENTIAL HYPERTENSION: ICD-10-CM

## 2024-12-02 RX ORDER — AMLODIPINE BESYLATE 10 MG/1
10 TABLET ORAL DAILY
Qty: 10 TABLET | Refills: 0 | Status: SHIPPED | OUTPATIENT
Start: 2024-12-02

## 2024-12-03 ENCOUNTER — HOSPITAL ENCOUNTER (OUTPATIENT)
Dept: GENERAL RADIOLOGY | Age: 79
Discharge: HOME | End: 2024-12-03
Attending: INTERNAL MEDICINE
Payer: MEDICARE

## 2024-12-03 DIAGNOSIS — Z01.818 PRE-OP EXAM: ICD-10-CM

## 2024-12-03 PROCEDURE — 71046 X-RAY EXAM CHEST 2 VIEWS: CPT | Performed by: INTERNAL MEDICINE

## 2024-12-06 NOTE — PAT NURSING NOTE
PreOp Instructions     You are scheduled for: a Cardiac Procedure     Date of Procedure: 12/10/24     Diet Instructions: Do not eat or drink anything after midnight including gum, mints, candy, etc the night before your procedure.     Medications: Take Aspirin 81 mg x 4 tablets the day of your procedure. Medications you are allowed to take can be taken with a sip of water the morning of your procedure.     Medications to Stop: DO NOT TAKE any herbal supplements and vitamins the morning of your procedure.     Skin Prep : Shower with antibacterial soap using a clean washcloth, prior to procedure. Once dried off, no lotions/powders/creams/ointments, etc.     Arrival Time: The day prior to your procedure you will receive a phone call between 3:00 pm- 6:00 pm with your arrival time. If you haven't received a phone call, please check your voicemail messages., If you did not receive a voice mail and it is after 6:00 pm, please call the nursing supervisor at [phone].    Driving After Procedure: Sedation will be given so you WILL NOT be able to drive home. You will need a responsible adult  to drive you home. You can NOT take uber or taxi unless approved by your physician in advance.     Discharge Teaching: Your nurse will give you specific instructions before discharge, Most people can resume normal activities in 2-3 days, Any questions, please call the physician's office

## 2024-12-09 DIAGNOSIS — I10 ESSENTIAL HYPERTENSION: ICD-10-CM

## 2024-12-09 RX ORDER — AMLODIPINE BESYLATE 10 MG/1
10 TABLET ORAL DAILY
Qty: 10 TABLET | Refills: 0 | Status: SHIPPED | OUTPATIENT
Start: 2024-12-09 | End: 2024-12-09

## 2024-12-09 RX ORDER — AMLODIPINE BESYLATE 10 MG/1
10 TABLET ORAL DAILY
Qty: 30 TABLET | Refills: 0 | Status: SHIPPED | OUTPATIENT
Start: 2024-12-09

## 2024-12-10 ENCOUNTER — HOSPITAL ENCOUNTER (OUTPATIENT)
Dept: INTERVENTIONAL RADIOLOGY/VASCULAR | Facility: HOSPITAL | Age: 79
Discharge: HOME | End: 2024-12-10
Attending: INTERNAL MEDICINE | Admitting: INTERNAL MEDICINE
Payer: MEDICARE

## 2024-12-10 VITALS
DIASTOLIC BLOOD PRESSURE: 81 MMHG | OXYGEN SATURATION: 96 % | TEMPERATURE: 98 F | WEIGHT: 135 LBS | HEART RATE: 77 BPM | SYSTOLIC BLOOD PRESSURE: 157 MMHG | RESPIRATION RATE: 17 BRPM | BODY MASS INDEX: 25.16 KG/M2 | HEIGHT: 61.5 IN

## 2024-12-10 DIAGNOSIS — I10 HTN (HYPERTENSION): ICD-10-CM

## 2024-12-10 DIAGNOSIS — R06.02 SOB (SHORTNESS OF BREATH): ICD-10-CM

## 2024-12-10 PROCEDURE — 4A023N8 MEASUREMENT OF CARDIAC SAMPLING AND PRESSURE, BILATERAL, PERCUTANEOUS APPROACH: ICD-10-PCS | Performed by: INTERNAL MEDICINE

## 2024-12-10 PROCEDURE — B2151ZZ FLUOROSCOPY OF LEFT HEART USING LOW OSMOLAR CONTRAST: ICD-10-PCS | Performed by: INTERNAL MEDICINE

## 2024-12-10 PROCEDURE — B2111ZZ FLUOROSCOPY OF MULTIPLE CORONARY ARTERIES USING LOW OSMOLAR CONTRAST: ICD-10-PCS | Performed by: INTERNAL MEDICINE

## 2024-12-10 PROCEDURE — 93460 R&L HRT ART/VENTRICLE ANGIO: CPT | Performed by: INTERNAL MEDICINE

## 2024-12-10 PROCEDURE — 99152 MOD SED SAME PHYS/QHP 5/>YRS: CPT | Performed by: INTERNAL MEDICINE

## 2024-12-10 RX ORDER — SODIUM CHLORIDE 9 MG/ML
INJECTION, SOLUTION INTRAVENOUS
Status: COMPLETED | OUTPATIENT
Start: 2024-12-10 | End: 2024-12-10

## 2024-12-10 RX ORDER — IOPAMIDOL 755 MG/ML
200 INJECTION, SOLUTION INTRAVASCULAR
Status: COMPLETED | OUTPATIENT
Start: 2024-12-10 | End: 2024-12-10

## 2024-12-10 RX ORDER — HEPARIN SODIUM 5000 [USP'U]/ML
INJECTION, SOLUTION INTRAVENOUS; SUBCUTANEOUS
Status: COMPLETED
Start: 2024-12-10 | End: 2024-12-10

## 2024-12-10 RX ORDER — MIDAZOLAM HYDROCHLORIDE 1 MG/ML
INJECTION INTRAMUSCULAR; INTRAVENOUS
Status: DISCONTINUED
Start: 2024-12-10 | End: 2024-12-10 | Stop reason: WASHOUT

## 2024-12-10 RX ORDER — HEPARIN SODIUM 5000 [USP'U]/ML
INJECTION, SOLUTION INTRAVENOUS; SUBCUTANEOUS
Status: DISCONTINUED
Start: 2024-12-10 | End: 2024-12-10 | Stop reason: WASHOUT

## 2024-12-10 RX ORDER — LIDOCAINE HYDROCHLORIDE 10 MG/ML
INJECTION, SOLUTION EPIDURAL; INFILTRATION; INTRACAUDAL; PERINEURAL
Status: COMPLETED
Start: 2024-12-10 | End: 2024-12-10

## 2024-12-10 RX ORDER — MIDAZOLAM HYDROCHLORIDE 1 MG/ML
INJECTION INTRAMUSCULAR; INTRAVENOUS
Status: COMPLETED
Start: 2024-12-10 | End: 2024-12-10

## 2024-12-10 RX ORDER — LIDOCAINE HYDROCHLORIDE 10 MG/ML
INJECTION, SOLUTION EPIDURAL; INFILTRATION; INTRACAUDAL; PERINEURAL
Status: DISCONTINUED
Start: 2024-12-10 | End: 2024-12-10 | Stop reason: WASHOUT

## 2024-12-10 RX ADMIN — SODIUM CHLORIDE: 9 INJECTION, SOLUTION INTRAVENOUS at 08:51:00

## 2024-12-10 RX ADMIN — IOPAMIDOL 90 ML: 755 INJECTION, SOLUTION INTRAVASCULAR at 11:03:00

## 2024-12-10 NOTE — PROGRESS NOTES
Pt received s/p [facility] with Dr. Dvais. Right femoral arterial access site closed with angioseal. Right femoral venous access site closed with manual pressure by this RN upon arrival to [facility]. Both site CDI, no bleeding or hematoma present. Dr. Davis spoke with pt's  post procedure. Recovery complete. Discharge instructions given to pt and pt's . IV discontinued, pt taken down to [facility] via wheelchair for discharge.

## 2024-12-10 NOTE — DISCHARGE INSTRUCTIONS
HOME CARE INSTRUCTIONS FOLLOWING CORONARY ANGIOGRAPHY,  PERIPHERAL ANGIOGRAPHY, ANGIOPLASTY (PTCA/PTA) OR INSERTION  OF STENT IN THE CORONARY, CAROTID, AND/OR PERIPHERAL ARTERIES      Activity:   DO NOT drive after the procedure. You may resume driving late the following day according to the nurse or physician’s instructions   Plan on resting and relaxing tonight and tomorrow   Resume your normal activity after 48 hours, or as instructed by your physician   Do not lift anything over 10 pounds for the next 24 hours   Avoid sexual activity for the next 24 hours   Avoid drinking alcohol for the next 24 hours   If the groin site was used, avoid repeated stair use and excessive walking for the next 24 hours       What is Normal?   A small lump at the procedure site associated with mild tenderness when touched   The procedure site may be bruised or discolored   There may be a small amount of drainage on the bandage    Special Instructions:   Drink plenty of fluids during the next 24 hours to “flush” the contrast from your system   The bandage is to remain in place for 24 hours   Keep the bandage clean and dry   DO NOT submerge the procedure site for 72 hours (no bath tubs or pools).    After 24 hours, you must remove the bandage   You should shower after removing the bandage, and wash the procedure site gently with soap and water   If you choose to wear a bandage for a few days, make sure it remains clean and dry and that it is changed daily    When you should NOTIFY YOUR PHYSICIAN:   Bleeding can occur at the procedure site - both on the outside of the skin and/or beneath the surface of the skin   Swelling or a large lump at the procedure site can occur, which may be accompanied by moderate to severe pain. If either of the above occurs, lie down flat. Have someone apply pressure to the procedure site with both hands, as instructed by the nurse. Hold pressure for 20 minutes and the bleeding should stop. Notify your  physician of the occurrence. If the bleeding does not stop, call 911 and continue to apply pressure   If you experience signs of a fever, temperature >101 degrees, chills, infection (redness, swelling, thick yellow drainage, or a foul odor from the procedure site)   If you notice any numbness, tingling, or loss of feeling to your leg or foot for groin access    Other:  - You may resume your present diet, unless otherwise specified by your physician.   - You may resume all of your medications as prescribed, unless otherwise directed by your physician. A list of your medications was provided to you at discharge.  - Do not make any personal/business decisions and/or sign any legal documents for the next 24 hours.

## 2024-12-11 NOTE — PROCEDURES
[facility]    PATIENT'S NAME: ROB CALDWELL   ATTENDING PHYSICIAN: Vincent Davis M.D.   OPERATING PHYSICIAN: Vincent Davis M.D.   PATIENT ACCOUNT#:   [id number]    LOCATION:  [address]  MEDICAL RECORD #:   [id number]       YOB: 1945  ADMISSION DATE:       12/10/2024      OPERATION DATE:  12/10/2024    CARDIAC PROCEDURE TRANSCRIPTION    CARDIAC CATHETERIZATION     PREOPERATIVE DIAGNOSIS:    POSTOPERATIVE DIAGNOSIS:    PROCEDURE PERFORMED:      INDICATION:  An 79-year-old female with history of peripheral disease and occluded right iliac, hypertension, and labile blood pressure with hiatal hernia, quit smoking in 1989, with increasing exertional dyspnea, with abnormal stress test, for cardiac catheterization.    DESCRIPTION OF PROCEDURE:  The left groin was anesthetized, 7-Stateless sheath in the left femoral vein, 6-Stateless in the left femoral artery.    PA pressures were 27/9.  RA was 6.  RV was 32/10, with a pulmonary capillary wedge of 5.  AO saturation was 83.7.  PA saturation was 66.8.    LV angiogram:  Very hyperdynamic, almost complete cavitary obliteration.    Left coronary arteriography:  The left main trifurcates into a circumflex which has 50% origin lesion, a huge obtuse marginal/ramus, and LAD.  The LAD is about 90% stenosed with about an 80% diagonal.      The right coronary artery has mild plaquing.      IMPRESSION:    1.   Hyperdynamic ventricle.  May need to consider restrictive physiology.  2.   Normal PA pressures.  3.   Low resting O2 saturation.  4.   An 80% diagonal, with 90% left anterior descending with 50% circumflex and mild disease in the right.    RECOMMENDATION:  We will discuss options of surgery versus intervention.  Given her age, would lean towards intervention.    Dictated By Vincent Davis M.D.  d: 12/10/2024 11:06:04  t: 12/10/2024 16:08:49  [facility] [id number]  [facility] [place]

## 2024-12-17 DIAGNOSIS — I10 ESSENTIAL HYPERTENSION: ICD-10-CM

## 2024-12-17 RX ORDER — AMLODIPINE BESYLATE 10 MG/1
10 TABLET ORAL DAILY
Qty: 10 TABLET | Refills: 0 | Status: SHIPPED | OUTPATIENT
Start: 2024-12-17

## 2024-12-23 ENCOUNTER — ORDER TRANSCRIPTION (OUTPATIENT)
Dept: ADMINISTRATIVE | Facility: HOSPITAL | Age: 79
End: 2024-12-23

## 2024-12-23 DIAGNOSIS — R06.09 DOE (DYSPNEA ON EXERTION): Primary | ICD-10-CM

## 2024-12-31 RX ORDER — CLOPIDOGREL BISULFATE 75 MG/1
75 TABLET ORAL DAILY
COMMUNITY

## 2024-12-31 NOTE — PAT NURSING NOTE
PAT call with patient. The following instructions were given and sent through her My Chart. Questions were answered and she verbalized understanding.    PreOp Instructions    You are scheduled for: a Cardiac Procedure    Date of Procedure: 01/08/25    Diet Instructions: Do not eat or drink anything after midnight including gum, mints, candy, etc.    Medications: Take an Aspirin 81 mg tablet the day of your procedure, Take Plavix 75mg the day of your procedure, Medications you are allowed to take can be taken with a sip of water the morning of your procedure    Medications to Stop: Hold herbal supplements and vitamins    Skin Prep : Shower with antibacterial soap using a clean washcloth, prior to procedure. Once dried off, no lotions/powders/creams/ointments, etc.    Arrival Time: The day prior to your procedure you will receive a phone call before 6:00 pm with your arrival time. If you haven't received a phone call, please check your voicemail messages., If you did not receive a voice mail and it is after 6:00 pm, please call the nursing supervisor at [phone].    Driving After Procedure: Sedation will be given so you WILL NOT be able to drive home. You will need a responsible adult  to drive you home. You can NOT take uber or taxi unless approved by your physician in advance.    Discharge Teaching: Your nurse will give you specific instructions before discharge, Any questions, please call the physician's office       parking is available starting at 6 am or park in the [place] parking garage at [facility]. Check in at the [facility] reception desk. Our  will be there to check you in for your procedure. Please bring your insurance cards and ID with you.

## 2025-01-01 DIAGNOSIS — K21.9 GASTROESOPHAGEAL REFLUX DISEASE WITHOUT ESOPHAGITIS: ICD-10-CM

## 2025-01-01 DIAGNOSIS — K44.9 HIATAL HERNIA: ICD-10-CM

## 2025-01-02 ENCOUNTER — ORDER TRANSCRIPTION (OUTPATIENT)
Dept: ADMINISTRATIVE | Facility: HOSPITAL | Age: 80
End: 2025-01-02

## 2025-01-02 DIAGNOSIS — R06.09 DOE (DYSPNEA ON EXERTION): Primary | ICD-10-CM

## 2025-01-08 ENCOUNTER — HOSPITAL ENCOUNTER (OUTPATIENT)
Dept: INTERVENTIONAL RADIOLOGY/VASCULAR | Facility: HOSPITAL | Age: 80
Discharge: HOME | End: 2025-01-08
Attending: INTERNAL MEDICINE | Admitting: INTERNAL MEDICINE
Payer: MEDICARE

## 2025-01-08 VITALS
OXYGEN SATURATION: 92 % | DIASTOLIC BLOOD PRESSURE: 58 MMHG | HEIGHT: 61.5 IN | HEART RATE: 66 BPM | SYSTOLIC BLOOD PRESSURE: 118 MMHG | RESPIRATION RATE: 13 BRPM | BODY MASS INDEX: 25.16 KG/M2 | TEMPERATURE: 98 F | WEIGHT: 135 LBS

## 2025-01-08 DIAGNOSIS — I25.10 CAD (CORONARY ARTERY DISEASE): ICD-10-CM

## 2025-01-08 PROCEDURE — 99211 OFF/OP EST MAY X REQ PHY/QHP: CPT

## 2025-01-08 PROCEDURE — 93010 ELECTROCARDIOGRAM REPORT: CPT | Performed by: INTERNAL MEDICINE

## 2025-01-08 PROCEDURE — 75625 CONTRAST EXAM ABDOMINL AORTA: CPT | Performed by: INTERNAL MEDICINE

## 2025-01-08 PROCEDURE — 92978 ENDOLUMINL IVUS OCT C 1ST: CPT | Performed by: INTERNAL MEDICINE

## 2025-01-08 PROCEDURE — B4101ZZ FLUOROSCOPY OF ABDOMINAL AORTA USING LOW OSMOLAR CONTRAST: ICD-10-PCS | Performed by: INTERNAL MEDICINE

## 2025-01-08 PROCEDURE — B240ZZ3 ULTRASONOGRAPHY OF SINGLE CORONARY ARTERY, INTRAVASCULAR: ICD-10-PCS | Performed by: INTERNAL MEDICINE

## 2025-01-08 PROCEDURE — 93005 ELECTROCARDIOGRAM TRACING: CPT

## 2025-01-08 PROCEDURE — 027034Z DILATION OF CORONARY ARTERY, ONE ARTERY WITH DRUG-ELUTING INTRALUMINAL DEVICE, PERCUTANEOUS APPROACH: ICD-10-PCS | Performed by: INTERNAL MEDICINE

## 2025-01-08 PROCEDURE — 99152 MOD SED SAME PHYS/QHP 5/>YRS: CPT | Performed by: INTERNAL MEDICINE

## 2025-01-08 PROCEDURE — 99153 MOD SED SAME PHYS/QHP EA: CPT | Performed by: INTERNAL MEDICINE

## 2025-01-08 RX ORDER — HEPARIN SODIUM 5000 [USP'U]/ML
INJECTION, SOLUTION INTRAVENOUS; SUBCUTANEOUS
Status: COMPLETED
Start: 2025-01-08 | End: 2025-01-08

## 2025-01-08 RX ORDER — PROTAMINE SULFATE 10 MG/ML
INJECTION, SOLUTION INTRAVENOUS
Status: COMPLETED
Start: 2025-01-08 | End: 2025-01-08

## 2025-01-08 RX ORDER — IOPAMIDOL 755 MG/ML
100 INJECTION, SOLUTION INTRAVASCULAR
Status: COMPLETED | OUTPATIENT
Start: 2025-01-08 | End: 2025-01-08

## 2025-01-08 RX ORDER — CLOPIDOGREL BISULFATE 75 MG/1
75 TABLET ORAL DAILY
Status: DISCONTINUED | OUTPATIENT
Start: 2025-01-09 | End: 2025-01-08

## 2025-01-08 RX ORDER — LIDOCAINE HYDROCHLORIDE 10 MG/ML
INJECTION, SOLUTION EPIDURAL; INFILTRATION; INTRACAUDAL; PERINEURAL
Status: COMPLETED
Start: 2025-01-08 | End: 2025-01-08

## 2025-01-08 RX ORDER — SODIUM CHLORIDE 9 MG/ML
INJECTION, SOLUTION INTRAVENOUS CONTINUOUS
Status: DISCONTINUED | OUTPATIENT
Start: 2025-01-08 | End: 2025-01-08

## 2025-01-08 RX ORDER — SODIUM CHLORIDE 9 MG/ML
INJECTION, SOLUTION INTRAVENOUS
Status: DISCONTINUED | OUTPATIENT
Start: 2025-01-09 | End: 2025-01-08 | Stop reason: HOSPADM

## 2025-01-08 RX ORDER — ASPIRIN 81 MG/1
81 TABLET ORAL DAILY
Status: DISCONTINUED | OUTPATIENT
Start: 2025-01-09 | End: 2025-01-08

## 2025-01-08 RX ORDER — MIDAZOLAM HYDROCHLORIDE 1 MG/ML
INJECTION INTRAMUSCULAR; INTRAVENOUS
Status: COMPLETED
Start: 2025-01-08 | End: 2025-01-08

## 2025-01-08 RX ADMIN — IOPAMIDOL 100 ML: 755 INJECTION, SOLUTION INTRAVASCULAR at 10:10:00

## 2025-01-08 NOTE — PLAN OF CARE
----- Message from Mitch Serrano MD sent at 3/24/2017  4:11 PM CDT -----  Skin lesion was benign.   Patient had PCI today with Dr. Davis. Bilateral groins soft, CDI, no hematoma, +1 pedal pulses. VSS. Patient denies any pain. Family @ bedside. 12 lead EKG completed. Cardiac rehab @ bedside, stent card given to patient. Dr. Davis @ bedside post procedure. Patient ambulated to  and in aguirre, tolerated well. Groin stable. Discharge instructions reviewed. IV D/C'd. Patient discharged to [facility] by wheelchair with belongings. Patient's  is .

## 2025-01-08 NOTE — DIETARY NOTE
Clinical Nutrition    Dietitian consult received per cardiac rehab standing order. Pt to be educated by cardiac rehab staff and encouraged to attend outpatient classes taught by RD. RD available PRN.    Kristine Lindsey MS, RD, LDN  Clinical Dietitian  Ext: [phone]

## 2025-01-08 NOTE — DISCHARGE INSTRUCTIONS
Your orientation appointment for cardiac rehabilitation is 2/13  at 10 am .    Please bring an order for cardiac rehabilitation from your cardiologist.    Please verify your insurance coverage and request an HMO referral if needed prior to your first appointment.    Your appointment will be one hour long.    If you have questions about cardiac rehabilitation, please call [phone].      ?    HOME CARE INSTRUCTIONS    Activity:   DO NOT drive within the first 24 hours after the procedure. You may resume driving late the following day according to the nurse or physician's instructions   Plan on resting and relaxing today and tomorrow  Resume your normal activity after 48 hours, or as instructed by your physician   Do not lift anything over 10 pounds for the next 7 days   Avoid sexual activity for the next 24 hours   Avoid drinking alcohol for the next 24 hours   If the groin site was used, avoid repeated stair use and excessive walking for the next 24 hours   If the wrist was used, avoid bending/flexing of the wrist for the next 24 hours  What is Normal:   A small lump (roughly 2-3 cm) at the procedure site associated with mild tenderness when touched that may last up to a few weeks, but should progressively improve  The procedure site may be bruised or discolored   There may be a small amount of drainage on the bandage   Special Instructions:   Drink plenty of fluids during the next 24 hours to “flush” the contrast from your system   The bandage is to remain in place for 24 hours   Keep the bandage clean and dry      If bleeding or swelling should occur, apply manual pressure directly to the site and call the  Physician    ? Notify the physician if the patient experiences loss of sensation  ? For minor discomfort, patients may take an over-the-counter analgesic such as Tylenol or  may use an ice pack if swelling is present  DO NOT submerge the procedure site for 7 days (no bath tubs or pools)   This includes  dishwashing/submersion of the wrist, if the wrist was used   After 24 hours, you must remove the bandage   Ok to shower and wash the procedure site gently with soap and water   Wearing a bandage is not necessary. If you choose to wear a bandage for a few days, make sure it remains clean and dry and that it is changed daily

## 2025-01-08 NOTE — PROCEDURES
[facility]    PATIENT'S NAME: ROB CALDWELL   ATTENDING PHYSICIAN: Vincent Davis M.D.   OPERATING PHYSICIAN: Vincent Davis M.D.   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    DESCRIPTION OF PROCEDURE:  The right groin was anesthetized.  The right iliac was occluded.  This was eventually closed with an Angio-Seal.    Left groin was accessed.  I used a 3.5 EBU to try and be outside the ostium because of the bifid origin.  I was able to wire the LAD from the aorta.  The vessel was predilated with a 2.5 balloon.  IVUS was performed.  It showed a 3.0 vessel proximally.  A 2.5 x 30 stent was placed, postdilated with a 3.0 NC with an excellent result.    Angiography of the abdominal aorta showed diffuse disease abdominal aorta with occluded right common iliac.    IMPRESSION:    1.   A 90% diffuse proximal LAD successfully stented with a 2.5 x 30 stent, postdilated with a 3.0 NC.  2.   A 100% right common iliac.  3.   Diffuse diseased distal abdominal aorta.  4.   This is a bifid origin and in the future I would try a 3.5 EBU and wire the vessel from the aorta.    Dictated By Vincent Davis M.D.  d: 01/08/2025 10:03:51  t: 01/08/2025 13:13:20  Job [id number]  G/

## 2025-01-09 LAB
ATRIAL RATE: 69 BPM
P AXIS: 44 DEGREES
P-R INTERVAL: 166 MS
Q-T INTERVAL: 398 MS
QRS DURATION: 72 MS
QTC CALCULATION (BEZET): 426 MS
R AXIS: 46 DEGREES
T AXIS: 51 DEGREES
VENTRICULAR RATE: 69 BPM

## 2025-01-12 DIAGNOSIS — I10 ESSENTIAL HYPERTENSION: ICD-10-CM

## 2025-01-13 RX ORDER — AMLODIPINE BESYLATE 10 MG/1
10 TABLET ORAL DAILY
Qty: 30 TABLET | Refills: 0 | Status: SHIPPED | OUTPATIENT
Start: 2025-01-13

## 2025-01-14 ENCOUNTER — HOSPITAL ENCOUNTER (OUTPATIENT)
Dept: MAMMOGRAPHY | Age: 80
Discharge: HOME | End: 2025-01-14
Attending: FAMILY MEDICINE
Payer: MEDICARE

## 2025-01-14 ENCOUNTER — HOSPITAL ENCOUNTER (OUTPATIENT)
Dept: BONE DENSITY | Age: 80
Discharge: HOME | End: 2025-01-14
Attending: FAMILY MEDICINE
Payer: MEDICARE

## 2025-01-14 DIAGNOSIS — Z13.820 SCREENING FOR OSTEOPOROSIS: ICD-10-CM

## 2025-01-14 DIAGNOSIS — Z78.0 ASYMPTOMATIC MENOPAUSE: ICD-10-CM

## 2025-01-14 DIAGNOSIS — R92.8 ABNORMAL MAMMOGRAM: ICD-10-CM

## 2025-01-14 PROCEDURE — 77066 DX MAMMO INCL CAD BI: CPT | Performed by: FAMILY MEDICINE

## 2025-01-14 PROCEDURE — 77080 DXA BONE DENSITY AXIAL: CPT | Performed by: FAMILY MEDICINE

## 2025-01-14 PROCEDURE — 77062 BREAST TOMOSYNTHESIS BI: CPT | Performed by: FAMILY MEDICINE

## 2025-01-19 DIAGNOSIS — I10 ESSENTIAL HYPERTENSION: ICD-10-CM

## 2025-01-20 ENCOUNTER — ORDER TRANSCRIPTION (OUTPATIENT)
Dept: ADMINISTRATIVE | Facility: HOSPITAL | Age: 80
End: 2025-01-20

## 2025-01-20 ENCOUNTER — ORDER TRANSCRIPTION (OUTPATIENT)
Dept: CARDIAC REHAB | Facility: HOSPITAL | Age: 80
End: 2025-01-20

## 2025-01-20 DIAGNOSIS — Z95.5 STENTED CORONARY ARTERY: Primary | ICD-10-CM

## 2025-01-20 DIAGNOSIS — R06.09 DOE (DYSPNEA ON EXERTION): Primary | ICD-10-CM

## 2025-01-20 DIAGNOSIS — Z95.828 PRESENCE OF STENT IN ARTERY: ICD-10-CM

## 2025-01-20 RX ORDER — AMLODIPINE BESYLATE 10 MG/1
10 TABLET ORAL DAILY
Qty: 90 TABLET | Refills: 0 | Status: SHIPPED | OUTPATIENT
Start: 2025-01-20

## 2025-01-20 NOTE — TELEPHONE ENCOUNTER
.A refill request was received for:  Requested Prescriptions     Pending Prescriptions Disp Refills    AMLODIPINE 10 MG Oral Tab [Pharmacy Med Name: Amlodipine Besylate 10 Mg Tab Unic] 10 tablet 0     Sig: TAKE ONE TABLET BY MOUTH ONE TIME DAILY       Last refill date:     1/13/25 10 pills    Last office visit: 2/14/24    Follow up due:  Future Appointments   Date Time Provider Department Center   1/21/2025  9:00 AM PFT ROOM  [facility]   2/13/2025 10:00 AM CARD PULM INITIAL  CPULM [facility]   2/17/2025 12:00 PM Pat Roa,  [facility] [address]

## 2025-01-21 ENCOUNTER — RT VISIT (OUTPATIENT)
Dept: RESPIRATORY THERAPY | Facility: HOSPITAL | Age: 80
End: 2025-01-21
Attending: PHYSICIAN ASSISTANT
Payer: MEDICARE

## 2025-01-21 DIAGNOSIS — Z95.828 PRESENCE OF STENT IN ARTERY: ICD-10-CM

## 2025-01-21 DIAGNOSIS — R06.09 DOE (DYSPNEA ON EXERTION): ICD-10-CM

## 2025-01-21 PROCEDURE — 94729 DIFFUSING CAPACITY: CPT | Performed by: INTERNAL MEDICINE

## 2025-01-21 PROCEDURE — 94726 PLETHYSMOGRAPHY LUNG VOLUMES: CPT | Performed by: INTERNAL MEDICINE

## 2025-01-21 PROCEDURE — 94060 EVALUATION OF WHEEZING: CPT | Performed by: INTERNAL MEDICINE

## 2025-01-21 NOTE — TELEPHONE ENCOUNTER
Pt is requesting a 90 day supply or 30 if possible rather than 10. Pt is coming into office 217/25

## 2025-01-24 NOTE — PROCEDURES
Patient is an 79-year-old female being assessed with a diagnosis of dyspnea on exertion.    Results    FEV1 is 1 L mildly reduced at -2.08  FVC 1.46 L mildly reduced at -1.82  Ratio is mildly reduced at 68%.  Following the administration of bronchodilators there was a significant improvement in FEV1 to 1.18 L per 19% change  MVV is severely reduced 41% of predicted    Total lung capacity 3.39 L within normal range at -1.64  Residual volume 1.92 L normal at -0.08    Diffusion capacity is mildly reduced -2.02  When corrected for alveolar volume there is significant improvement to the normal range -0.12    Impression--there is mild airways obstruction with significant improvement following the administration of bronchodilators.  Reduced MVV may be related to body habitus/effort/airways obstruction.  Lung volumes are normal  Diffusion capacity is mildly reduced though corrects for alveolar volume--clinical correlation suggested to assess for component anemia/early pulmonary vascular disease/early ILD.    No prior studies were found

## 2025-02-13 ENCOUNTER — CARDPULM VISIT (OUTPATIENT)
Age: 80
End: 2025-02-13
Attending: INTERNAL MEDICINE
Payer: MEDICARE

## 2025-02-15 DIAGNOSIS — I10 ESSENTIAL HYPERTENSION: ICD-10-CM

## 2025-02-15 RX ORDER — AMLODIPINE BESYLATE 10 MG/1
10 TABLET ORAL DAILY
Qty: 30 TABLET | Refills: 0 | Status: SHIPPED | OUTPATIENT
Start: 2025-02-15 | End: 2025-02-17

## 2025-02-15 NOTE — TELEPHONE ENCOUNTER
A refill request was received for:  Requested Prescriptions     Pending Prescriptions Disp Refills    AMLODIPINE 10 MG Oral Tab [Pharmacy Med Name: Amlodipine Besylate 10 Mg Tab Unic] 30 tablet 0     Sig: TAKE ONE TABLET BY MOUTH ONE TIME DAILY       Last refill date:   1/20/25    Last office visit: Due since August for f/u coming in Monday 2/17    Follow up due:  Future Appointments   Date Time Provider Department Center   2/17/2025 12:00 PM Pat Roa DO [facility] [address]   2/21/2025 11:15 AM  CARDIAC REHAB ROOM 1 [facility]   2/24/2025 11:15 AM  CARDIAC REHAB ROOM 1 [facility]   2/26/2025 11:15 AM  CARDIAC REHAB ROOM 1 [facility]   2/28/2025 11:15 AM  CARDIAC REHAB ROOM 1 [facility]   3/3/2025 11:15 AM  CARDIAC REHAB ROOM 1 [facility]   3/5/2025 11:15 AM  CARDIAC REHAB ROOM 1 [facility]

## 2025-02-17 ENCOUNTER — OFFICE VISIT (OUTPATIENT)
Dept: FAMILY MEDICINE CLINIC | Facility: CLINIC | Age: 80
End: 2025-02-17
Payer: MEDICARE

## 2025-02-17 VITALS
RESPIRATION RATE: 16 BRPM | HEIGHT: 61.5 IN | BODY MASS INDEX: 24.79 KG/M2 | OXYGEN SATURATION: 94 % | HEART RATE: 74 BPM | DIASTOLIC BLOOD PRESSURE: 80 MMHG | SYSTOLIC BLOOD PRESSURE: 136 MMHG | WEIGHT: 133 LBS

## 2025-02-17 DIAGNOSIS — E55.9 VITAMIN D DEFICIENCY: ICD-10-CM

## 2025-02-17 DIAGNOSIS — Z00.00 ENCOUNTER FOR ANNUAL HEALTH EXAMINATION: Primary | ICD-10-CM

## 2025-02-17 DIAGNOSIS — I70.0 AORTIC ATHEROSCLEROSIS: ICD-10-CM

## 2025-02-17 DIAGNOSIS — Z12.31 ENCOUNTER FOR SCREENING MAMMOGRAM FOR HIGH-RISK PATIENT: ICD-10-CM

## 2025-02-17 DIAGNOSIS — I10 ESSENTIAL HYPERTENSION: ICD-10-CM

## 2025-02-17 DIAGNOSIS — I25.10 CORONARY ARTERY DISEASE INVOLVING NATIVE CORONARY ARTERY OF NATIVE HEART WITHOUT ANGINA PECTORIS: ICD-10-CM

## 2025-02-17 DIAGNOSIS — I74.5 ILIAC ARTERY OCCLUSION (HCC): ICD-10-CM

## 2025-02-17 DIAGNOSIS — K57.90 DIVERTICULOSIS OF INTESTINE WITHOUT BLEEDING, UNSPECIFIED INTESTINAL TRACT LOCATION: ICD-10-CM

## 2025-02-17 DIAGNOSIS — M81.8 AGE-RELATED OSTEOPOROSIS WITHOUT FRACTURE: ICD-10-CM

## 2025-02-17 DIAGNOSIS — J42 CHRONIC BRONCHITIS, UNSPECIFIED CHRONIC BRONCHITIS TYPE (HCC): ICD-10-CM

## 2025-02-17 DIAGNOSIS — H35.3212 EXUDATIVE AGE-RELATED MACULAR DEGENERATION OF RIGHT EYE WITH INACTIVE CHOROIDAL NEOVASCULARIZATION (HCC): ICD-10-CM

## 2025-02-17 DIAGNOSIS — Z95.5 HISTORY OF HEART ARTERY STENT: ICD-10-CM

## 2025-02-17 DIAGNOSIS — K21.9 GASTROESOPHAGEAL REFLUX DISEASE WITHOUT ESOPHAGITIS: ICD-10-CM

## 2025-02-17 DIAGNOSIS — I73.9 PAD (PERIPHERAL ARTERY DISEASE): ICD-10-CM

## 2025-02-17 DIAGNOSIS — K44.9 HIATAL HERNIA: ICD-10-CM

## 2025-02-17 PROBLEM — R06.09 DOE (DYSPNEA ON EXERTION): Status: RESOLVED | Noted: 2025-01-21 | Resolved: 2025-02-17

## 2025-02-17 RX ORDER — FLUTICASONE PROPIONATE 50 MCG
2 SPRAY, SUSPENSION (ML) NASAL NIGHTLY
Qty: 3 EACH | Refills: 0 | Status: SHIPPED | OUTPATIENT
Start: 2025-02-17

## 2025-02-17 RX ORDER — AMLODIPINE BESYLATE 10 MG/1
10 TABLET ORAL DAILY
Qty: 90 TABLET | Refills: 1 | Status: SHIPPED | OUTPATIENT
Start: 2025-02-17

## 2025-02-17 RX ORDER — OMEPRAZOLE 20 MG/1
20 CAPSULE, DELAYED RELEASE ORAL DAILY
Qty: 90 CAPSULE | Refills: 1 | Status: SHIPPED | OUTPATIENT
Start: 2025-02-17

## 2025-02-17 RX ORDER — ATORVASTATIN CALCIUM 40 MG/1
40 TABLET, FILM COATED ORAL NIGHTLY
Qty: 90 TABLET | Refills: 1 | Status: SHIPPED | OUTPATIENT
Start: 2025-02-17

## 2025-02-17 NOTE — PROGRESS NOTES
Subjective:   Stefani Todd is a 79 year old female who presents for a Medicare Subsequent Annual Wellness visit (Pt already had Initial Annual Wellness) and scheduled follow up of multiple significant but stable problems.     Pt also here with    Gastroesophageal reflux disease without esophagitis    Essential hypertension    Age-related osteoporosis without fracture    Iliac artery occlusion (HCC)    Hiatal hernia    Diverticulosis of intestine without bleeding    Aortic atherosclerosis    PAD (peripheral artery disease)    Exudative age-related macular degeneration of right eye with inactive choroidal neovascularization (HCC)    Chronic bronchitis, unspecified chronic bronchitis type (HCC)    Coronary artery disease involving native coronary artery of native heart without angina pectoris    History of heart artery stent    Vitamin D deficiency         History/Other:   Fall Risk Assessment:   She has been screened for Falls and is low risk.      Cognitive Assessment:   She had a completely normal cognitive assessment - see flowsheet entries     Functional Ability/Status:   Stefani Todd has some abnormal functions as listed below:  She has Vision problems based on screening of functional status.       Depression Screening (PHQ):  PHQ-2 SCORE: 0  , done 2/19/2025        Advanced Directives:   She does have a Living Will but we do NOT have it on file in Epic.    She does have a POA but we do NOT have it on file in Epic.    Discussed Advance Care Planning with patient (and family/surrogate if present). Standard forms made available to patient in After Visit Summary.      Patient Active Problem List   Diagnosis    Gastroesophageal reflux disease without esophagitis- stable     Essential hypertension- stable     Age-related osteoporosis without fracture- stable     Iliac artery occlusion (HCC)- stable     Hiatal hernia- stable     Diverticulosis of intestine without bleeding- stable     Aortic atherosclerosis-  stable     PAD (peripheral artery disease)- stable     Exudative age-related macular degeneration of right eye with inactive choroidal neovascularization (HCC)- stable     Chronic bronchitis, unspecified chronic bronchitis type (HCC)- stable     Coronary artery disease involving native coronary artery of native heart without angina pectoris- stable     History of heart artery stent- stable     Vitamin D deficiency- stable      Allergies:  She has No Known Allergies.    Current Medications:  Outpatient Medications Marked as Taking for the 2/17/25 encounter (Office Visit) with Pat Roa, DO   Medication Sig    amLODIPine 10 MG Oral Tab Take 1 tablet (10 mg total) by mouth daily.    atorvastatin 40 MG Oral Tab Take 1 tablet (40 mg total) by mouth nightly.    omeprazole 20 MG Oral Capsule Delayed Release Take 1 capsule (20 mg total) by mouth daily.    fluticasone propionate 50 MCG/ACT Nasal Suspension 2 sprays by Nasal route nightly.    Multiple Vitamins-Minerals (PRESERVISION AREDS 2 OR) Take by mouth daily.    clopidogrel 75 MG Oral Tab Take 1 tablet (75 mg total) by mouth daily.    Irbesartan 150 MG Oral Tab Take 1 tablet (150 mg total) by mouth daily.    PROLENSA 0.07 % Ophthalmic Solution INSTILL ONE DROP IN THE RIGHT EYE AND TWO DROPS IN THE LEFT EYE.    hydrALAZINE 10 MG Oral Tab Take 1 tablet (10 mg total) by mouth 4 (four) times daily as needed. (Patient taking differently: Take 1 tablet (10 mg total) by mouth 4 (four) times daily as needed. Take for sbp >150mmhg. Patient states rarely needs to take.)    Denosumab 60 MG/ML Subcutaneous Solution Prefilled Syringe Inject 1 mL (60 mg total) into the skin every 6 (six) months. Historical documentation - see Epic Immunization Activity for administration details    aspirin 81 MG Oral Tab Take 1 tablet (81 mg total) by mouth daily.    Cholecalciferol (VITAMIN D) 1000 UNITS Oral Tab Take 1 tablet by mouth daily.       Medical History:  She  has a past medical  history of Abdominal hernia, Abdominal pain, Age-related osteoporosis without fracture, Anxiety, Back problem, Black stools, Blurred vision, Constipation, Fracture of olecranon process of right ulna, closed, initial encounter (2015), Herpes zoster without mention of complication, High blood pressure, High cholesterol, Leaking of urine, Occlusion and stenosis of carotid artery without mention of cerebral infarction, Pain with bowel movements, PONV (postoperative nausea and vomiting), Prolapse, post-hysterectomy, Shortness of breath, and Wears glasses.  Surgical History:  She  has a past surgical history that includes ; other; hysterectomy (mid ); tonsillectomy; cyst aspiration left (Left, ?); cyst aspiration right (Right, ?); and oophorectomy (Bilateral, mid ).   Family History:  Her family history includes CHF in her mother; Heart Attack in her mother; Heart Disorder in her mother; Hypertension in her mother; MVC (age of onset: 36) in her father.  Social History:  She  reports that she has quit smoking. Her smoking use included cigarettes. She has never used smokeless tobacco. She reports current alcohol use of about 8.3 standard drinks of alcohol per week. She reports that she does not use drugs.    Tobacco:  She smoked tobacco in the past but quit greater than 12 months ago.  Social History     Tobacco Use   Smoking Status Former    Types: Cigarettes   Smokeless Tobacco Never   Tobacco Comments    Quit 25 yrs ago          CAGE Alcohol Screen:   CAGE screening score of 0 on 2025, showing low risk of alcohol abuse.    Patient Care Team:  Pat Roa DO as PCP - General (Family Medicine)  Romy Hawkins PT as Physical Therapist  Angelita Lee PTA as Physical Therapy Assistant (Physical Therapy)    Review of Systems  GENERAL: feels well otherwise  SKIN: denies any unusual skin lesions  EYES: denies blurred vision or double vision  HEENT: denies nasal congestion, sinus pain or  ST  LUNGS: denies shortness of breath with exertion  CARDIOVASCULAR: denies chest pain on exertion  GI: denies abdominal pain, denies heartburn  : denies dysuria, vaginal discharge or itching, no complaint of urinary incontinence   MUSCULOSKELETAL: denies back pain  NEURO: denies headaches  PSYCHE: denies depression or anxiety  HEMATOLOGIC: denies hx of anemia  ENDOCRINE: denies thyroid history  ALL/ASTHMA: denies hx of allergy or asthma    Objective:   Physical Exam  General Appearance:  Alert, cooperative, no distress, appears stated age   Head:  Normocephalic, without obvious abnormality, atraumatic   Eyes:  PERRL, conjunctiva/corneas clear, EOM's intact both eyes   Ears:  Normal TM's and external ear canals, both ears   Nose: Nares normal, septum midline,mucosa normal, no drainage or sinus tenderness   Throat: Lips, mucosa, and tongue normal; teeth and gums normal   Neck: Supple, symmetrical, trachea midline, no adenopathy;  thyroid: not enlarged, symmetric, no tenderness/mass/nodules; no carotid bruit or JVD   Back:   Symmetric, no curvature, ROM normal, no CVA tenderness   Lungs:   Clear to auscultation bilaterally, respirations unlabored   Heart:  Regular rate and rhythm, S1 and S2 normal, no murmur, rub, or gallop   Abdomen:   Soft, non-tender, bowel sounds active all four quadrants,  no masses, no organomegaly   Pelvic: Deferred   Extremities: Extremities normal, atraumatic, no cyanosis or edema   Pulses: 2+ and symmetric   Skin: Skin color, texture, turgor normal, no rashes or lesions   Lymph nodes: Cervical, supraclavicular, and axillary nodes normal   Neurologic: Normal       /80   Pulse 74   Resp 16   Ht 5' 1.5\" (1.562 m)   Wt 133 lb (60.3 kg)   SpO2 94%   BMI 24.72 kg/m²  Estimated body mass index is 24.72 kg/m² as calculated from the following:    Height as of this encounter: 5' 1.5\" (1.562 m).    Weight as of this encounter: 133 lb (60.3 kg).    Medicare Hearing Assessment:   Hearing  Screening    Screening Method: Whisper Test  Whisper Test Result: Pass         Visual Acuity:   Right Eye Visual Acuity: Corrected     Left Eye Visual Acuity: Corrected Left Eye Chart Acuity: 20/200   Both Eyes Visual Acuity: Corrected Both Eyes Chart Acuity: 20/200   Able To Tolerate Visual Acuity: Yes        Assessment & Plan:   Stefani Todd is a 79 year old female who presents for a Medicare Assessment.     1. Encounter for annual health examination (Primary)  -     Detailed, Mod Complex (88967)  2. Vitamin D deficiency- stable monitor   -     Vitamin D; Future; Expected date: 02/17/2025  -     Detailed, Mod Complex (28224)  3. Essential hypertension- stable cpm  -     Free T4, (Free Thyroxine); Future; Expected date: 02/17/2025  -     Assay, Thyroid Stim Hormone; Future; Expected date: 02/17/2025  -     Detailed, Mod Complex (76369)  -     amLODIPine Besylate; Take 1 tablet (10 mg total) by mouth daily.  Dispense: 90 tablet; Refill: 1  4. Aortic atherosclerosis- stable monitor   -     Lipid Panel; Future; Expected date: 02/17/2025  -     Detailed, Mod Complex (79063)  -     Atorvastatin Calcium; Take 1 tablet (40 mg total) by mouth nightly.  Dispense: 90 tablet; Refill: 1  5. Iliac artery occlusion (HCC)- stable, see cardiology   -     Lipid Panel; Future; Expected date: 02/17/2025  -     Detailed, Mod Complex (46981)  -     Atorvastatin Calcium; Take 1 tablet (40 mg total) by mouth nightly.  Dispense: 90 tablet; Refill: 1  6. PAD (peripheral artery disease)- stable, see cardiology   -     Lipid Panel; Future; Expected date: 02/17/2025  -     Detailed, Mod Complex (74521)  -     Atorvastatin Calcium; Take 1 tablet (40 mg total) by mouth nightly.  Dispense: 90 tablet; Refill: 1  7. Gastroesophageal reflux disease without esophagitis- stable monitor   -     CBC With Differential With Platelet; Future; Expected date: 02/17/2025  -     Comp Metabolic Panel (14); Future; Expected date: 02/17/2025  -     Magnesium;  Future; Expected date: 02/17/2025  -     Vitamin B12; Future; Expected date: 02/17/2025  -     Detailed, Mod Complex (43518)  -     Omeprazole; Take 1 capsule (20 mg total) by mouth daily.  Dispense: 90 capsule; Refill: 1  8. Age-related osteoporosis without fracture- stable monitor   -     Detailed, Mod Complex (06107)  9. Hiatal hernia- stable monitor   -     Detailed, Mod Complex (30523)  -     Omeprazole; Take 1 capsule (20 mg total) by mouth daily.  Dispense: 90 capsule; Refill: 1  10. Diverticulosis of intestine without bleeding, unspecified intestinal tract location- stable monitor   -     Detailed, Mod Complex (60915)  11. Exudative age-related macular degeneration of right eye with inactive choroidal neovascularization (HCC)- see eye specialist   -     Detailed, Mod Complex (99085)  12. Chronic bronchitis, unspecified chronic bronchitis type (HCC)- stable monitor   -     Detailed, Mod Complex (05569)  13. Coronary artery disease involving native coronary artery of native heart without angina pectoris- see cardiology   -     Detailed, Mod Complex (00020)  14. History of heart artery stent- see cardiology   -     Detailed, Mod Complex (60335)  15. Encounter for screening mammogram for high-risk patient  -     [facility] GOPAL 2D+3D SCREENING BILAT (CPT=77067/02962); Future; Expected date: 02/17/2025  Other orders  -     Fluticasone Propionate; 2 sprays by Nasal route nightly.  Dispense: 3 each; Refill: 0    The patient indicates understanding of these issues and agrees to the plan.  Reinforced healthy diet, lifestyle, and exercise.    Return in 1 year (on 2/17/2026).     Pat Roa DO, 2/17/2025     Supplementary Documentation:   General Health:  In the past six months, have you lost more than 10 pounds without trying?: 1 - Yes  Has your appetite been poor?: No  Type of Diet: Balanced  How does the patient maintain a good energy level?: Appropriate Exercise  How would you describe your daily physical activity?:  Moderate  How would you describe your current health state?: Good  How do you maintain positive mental well-being?: Social Interaction;Games;Puzzles;Visiting Friends;Visiting Family  On a scale of 0 to 10, with 0 being no pain and 10 being severe pain, what is your pain level?: 0 - (None)  In the past six months, have you experienced urine leakage?: 0-No  At any time do you feel concerned for the safety/well-being of yourself and/or your children, in your home or elsewhere?: No  Have you had any immunizations at another office such as Influenza, Hepatitis B, Tetanus, or Pneumococcal?: No    Health Maintenance   Topic Date Due    COVID-19 Vaccine (6 - 2024-25 season) 09/01/2024    Annual Depression Screening  01/01/2025    Fall Risk Screening (Annual)  01/01/2025    Annual Physical  02/14/2025    Influenza Vaccine  Completed    DEXA Scan  Completed    Pneumococcal Vaccine: 50+ Years  Completed    Zoster Vaccines  Completed    Meningococcal B Vaccine  Aged Out

## 2025-02-21 ENCOUNTER — CARDPULM VISIT (OUTPATIENT)
Age: 80
End: 2025-02-21
Attending: INTERNAL MEDICINE
Payer: MEDICARE

## 2025-02-21 ENCOUNTER — APPOINTMENT (OUTPATIENT)
Age: 80
End: 2025-02-21
Attending: INTERNAL MEDICINE
Payer: MEDICARE

## 2025-02-21 PROCEDURE — 93798 PHYS/QHP OP CAR RHAB W/ECG: CPT

## 2025-02-24 ENCOUNTER — APPOINTMENT (OUTPATIENT)
Age: 80
End: 2025-02-24
Attending: INTERNAL MEDICINE
Payer: MEDICARE

## 2025-02-24 ENCOUNTER — CARDPULM VISIT (OUTPATIENT)
Age: 80
End: 2025-02-24
Attending: INTERNAL MEDICINE
Payer: MEDICARE

## 2025-02-24 PROCEDURE — 93798 PHYS/QHP OP CAR RHAB W/ECG: CPT

## 2025-02-26 ENCOUNTER — APPOINTMENT (OUTPATIENT)
Age: 80
End: 2025-02-26
Attending: INTERNAL MEDICINE
Payer: MEDICARE

## 2025-02-28 ENCOUNTER — APPOINTMENT (OUTPATIENT)
Age: 80
End: 2025-02-28
Attending: INTERNAL MEDICINE
Payer: MEDICARE

## 2025-03-03 ENCOUNTER — APPOINTMENT (OUTPATIENT)
Age: 80
End: 2025-03-03
Attending: INTERNAL MEDICINE
Payer: MEDICARE

## 2025-03-03 ENCOUNTER — CARDPULM VISIT (OUTPATIENT)
Age: 80
End: 2025-03-03
Attending: INTERNAL MEDICINE
Payer: MEDICARE

## 2025-03-03 ENCOUNTER — APPOINTMENT (OUTPATIENT)
Age: 80
End: 2025-03-03
Payer: MEDICARE

## 2025-03-03 PROCEDURE — 93798 PHYS/QHP OP CAR RHAB W/ECG: CPT

## 2025-03-05 ENCOUNTER — APPOINTMENT (OUTPATIENT)
Age: 80
End: 2025-03-05
Attending: INTERNAL MEDICINE
Payer: MEDICARE

## 2025-03-05 ENCOUNTER — CARDPULM VISIT (OUTPATIENT)
Age: 80
End: 2025-03-05
Attending: INTERNAL MEDICINE
Payer: MEDICARE

## 2025-03-05 ENCOUNTER — APPOINTMENT (OUTPATIENT)
Age: 80
End: 2025-03-05
Payer: MEDICARE

## 2025-03-05 PROCEDURE — 93798 PHYS/QHP OP CAR RHAB W/ECG: CPT

## 2025-03-07 ENCOUNTER — CARDPULM VISIT (OUTPATIENT)
Age: 80
End: 2025-03-07
Attending: INTERNAL MEDICINE
Payer: MEDICARE

## 2025-03-07 PROCEDURE — 93798 PHYS/QHP OP CAR RHAB W/ECG: CPT

## 2025-03-10 ENCOUNTER — CARDPULM VISIT (OUTPATIENT)
Age: 80
End: 2025-03-10
Attending: INTERNAL MEDICINE
Payer: MEDICARE

## 2025-03-10 PROCEDURE — 93798 PHYS/QHP OP CAR RHAB W/ECG: CPT

## 2025-03-12 ENCOUNTER — CARDPULM VISIT (OUTPATIENT)
Age: 80
End: 2025-03-12
Attending: INTERNAL MEDICINE
Payer: MEDICARE

## 2025-03-12 PROCEDURE — 93798 PHYS/QHP OP CAR RHAB W/ECG: CPT

## 2025-03-14 ENCOUNTER — CARDPULM VISIT (OUTPATIENT)
Age: 80
End: 2025-03-14
Attending: INTERNAL MEDICINE
Payer: MEDICARE

## 2025-03-14 PROCEDURE — 93798 PHYS/QHP OP CAR RHAB W/ECG: CPT

## 2025-03-17 ENCOUNTER — CARDPULM VISIT (OUTPATIENT)
Age: 80
End: 2025-03-17
Attending: INTERNAL MEDICINE
Payer: MEDICARE

## 2025-03-17 PROCEDURE — 93798 PHYS/QHP OP CAR RHAB W/ECG: CPT

## 2025-03-19 ENCOUNTER — CARDPULM VISIT (OUTPATIENT)
Age: 80
End: 2025-03-19
Attending: INTERNAL MEDICINE
Payer: MEDICARE

## 2025-03-19 PROCEDURE — 93798 PHYS/QHP OP CAR RHAB W/ECG: CPT

## 2025-03-21 ENCOUNTER — CARDPULM VISIT (OUTPATIENT)
Age: 80
End: 2025-03-21
Attending: INTERNAL MEDICINE
Payer: MEDICARE

## 2025-03-21 PROCEDURE — 93798 PHYS/QHP OP CAR RHAB W/ECG: CPT

## 2025-03-24 ENCOUNTER — CARDPULM VISIT (OUTPATIENT)
Age: 80
End: 2025-03-24
Attending: INTERNAL MEDICINE
Payer: MEDICARE

## 2025-03-24 PROCEDURE — 93798 PHYS/QHP OP CAR RHAB W/ECG: CPT

## 2025-03-26 ENCOUNTER — CARDPULM VISIT (OUTPATIENT)
Age: 80
End: 2025-03-26
Attending: INTERNAL MEDICINE
Payer: MEDICARE

## 2025-03-26 PROCEDURE — 93798 PHYS/QHP OP CAR RHAB W/ECG: CPT

## 2025-03-28 ENCOUNTER — APPOINTMENT (OUTPATIENT)
Age: 80
End: 2025-03-28
Attending: INTERNAL MEDICINE
Payer: MEDICARE

## 2025-03-31 ENCOUNTER — CARDPULM VISIT (OUTPATIENT)
Age: 80
End: 2025-03-31
Attending: INTERNAL MEDICINE
Payer: MEDICARE

## 2025-03-31 PROCEDURE — 93798 PHYS/QHP OP CAR RHAB W/ECG: CPT

## 2025-04-02 ENCOUNTER — CARDPULM VISIT (OUTPATIENT)
Age: 80
End: 2025-04-02
Attending: INTERNAL MEDICINE
Payer: MEDICARE

## 2025-04-02 PROCEDURE — 93798 PHYS/QHP OP CAR RHAB W/ECG: CPT

## 2025-04-04 ENCOUNTER — CARDPULM VISIT (OUTPATIENT)
Age: 80
End: 2025-04-04
Attending: INTERNAL MEDICINE
Payer: MEDICARE

## 2025-04-04 PROCEDURE — 93798 PHYS/QHP OP CAR RHAB W/ECG: CPT

## 2025-04-07 ENCOUNTER — CARDPULM VISIT (OUTPATIENT)
Age: 80
End: 2025-04-07
Attending: INTERNAL MEDICINE
Payer: MEDICARE

## 2025-04-07 PROCEDURE — 93798 PHYS/QHP OP CAR RHAB W/ECG: CPT

## 2025-04-09 ENCOUNTER — CARDPULM VISIT (OUTPATIENT)
Age: 80
End: 2025-04-09
Attending: INTERNAL MEDICINE
Payer: MEDICARE

## 2025-04-09 PROCEDURE — 93798 PHYS/QHP OP CAR RHAB W/ECG: CPT

## 2025-04-11 ENCOUNTER — CARDPULM VISIT (OUTPATIENT)
Age: 80
End: 2025-04-11
Attending: INTERNAL MEDICINE
Payer: MEDICARE

## 2025-04-11 PROCEDURE — 93798 PHYS/QHP OP CAR RHAB W/ECG: CPT

## 2025-04-14 ENCOUNTER — CARDPULM VISIT (OUTPATIENT)
Age: 80
End: 2025-04-14
Attending: INTERNAL MEDICINE
Payer: MEDICARE

## 2025-04-14 PROCEDURE — 93798 PHYS/QHP OP CAR RHAB W/ECG: CPT

## 2025-04-16 ENCOUNTER — CARDPULM VISIT (OUTPATIENT)
Age: 80
End: 2025-04-16
Attending: INTERNAL MEDICINE
Payer: MEDICARE

## 2025-04-16 PROCEDURE — 93798 PHYS/QHP OP CAR RHAB W/ECG: CPT

## 2025-04-18 ENCOUNTER — CARDPULM VISIT (OUTPATIENT)
Age: 80
End: 2025-04-18
Attending: INTERNAL MEDICINE
Payer: MEDICARE

## 2025-04-18 PROCEDURE — 93798 PHYS/QHP OP CAR RHAB W/ECG: CPT

## 2025-04-21 ENCOUNTER — CARDPULM VISIT (OUTPATIENT)
Age: 80
End: 2025-04-21
Attending: INTERNAL MEDICINE
Payer: MEDICARE

## 2025-04-21 PROCEDURE — 93798 PHYS/QHP OP CAR RHAB W/ECG: CPT

## 2025-04-23 ENCOUNTER — CARDPULM VISIT (OUTPATIENT)
Age: 80
End: 2025-04-23
Attending: INTERNAL MEDICINE
Payer: MEDICARE

## 2025-04-23 PROCEDURE — 93798 PHYS/QHP OP CAR RHAB W/ECG: CPT

## 2025-04-25 ENCOUNTER — CARDPULM VISIT (OUTPATIENT)
Age: 80
End: 2025-04-25
Attending: INTERNAL MEDICINE
Payer: MEDICARE

## 2025-04-25 PROCEDURE — 93798 PHYS/QHP OP CAR RHAB W/ECG: CPT

## 2025-04-28 ENCOUNTER — CARDPULM VISIT (OUTPATIENT)
Age: 80
End: 2025-04-28
Attending: INTERNAL MEDICINE
Payer: MEDICARE

## 2025-04-28 PROCEDURE — 93798 PHYS/QHP OP CAR RHAB W/ECG: CPT

## 2025-04-30 ENCOUNTER — HOSPITAL ENCOUNTER (EMERGENCY)
Facility: HOSPITAL | Age: 80
Discharge: HOME | End: 2025-04-30
Attending: EMERGENCY MEDICINE
Payer: MEDICARE

## 2025-04-30 ENCOUNTER — CARDPULM VISIT (OUTPATIENT)
Age: 80
End: 2025-04-30
Attending: INTERNAL MEDICINE
Payer: MEDICARE

## 2025-04-30 VITALS
DIASTOLIC BLOOD PRESSURE: 71 MMHG | TEMPERATURE: 98 F | HEART RATE: 44 BPM | RESPIRATION RATE: 15 BRPM | SYSTOLIC BLOOD PRESSURE: 155 MMHG | OXYGEN SATURATION: 97 %

## 2025-04-30 DIAGNOSIS — I10 UNCONTROLLED HYPERTENSION: Primary | ICD-10-CM

## 2025-04-30 DIAGNOSIS — R00.1 BRADYCARDIA: ICD-10-CM

## 2025-04-30 LAB
ALBUMIN SERPL-MCNC: 4.7 G/DL (ref 3.2–4.8)
ALBUMIN/GLOB SERPL: 2 {RATIO} (ref 1–2)
ALP LIVER SERPL-CCNC: 96 U/L (ref 55–142)
ALT SERPL-CCNC: 46 U/L (ref 10–49)
ANION GAP SERPL CALC-SCNC: 7 MMOL/L (ref 0–18)
AST SERPL-CCNC: 34 U/L (ref ?–34)
BASOPHILS # BLD AUTO: 0.03 X10(3) UL (ref 0–0.2)
BASOPHILS NFR BLD AUTO: 0.6 %
BILIRUB SERPL-MCNC: 0.4 MG/DL (ref 0.2–1.1)
BUN BLD-MCNC: 13 MG/DL (ref 9–23)
CALCIUM BLD-MCNC: 10.2 MG/DL (ref 8.7–10.6)
CHLORIDE SERPL-SCNC: 106 MMOL/L (ref 98–112)
CO2 SERPL-SCNC: 28 MMOL/L (ref 21–32)
CREAT BLD-MCNC: 0.74 MG/DL (ref 0.55–1.02)
EGFRCR SERPLBLD CKD-EPI 2021: 81 ML/MIN/1.73M2 (ref 60–?)
EOSINOPHIL # BLD AUTO: 0.21 X10(3) UL (ref 0–0.7)
EOSINOPHIL NFR BLD AUTO: 3.9 %
ERYTHROCYTE [DISTWIDTH] IN BLOOD BY AUTOMATED COUNT: 13.9 %
GLOBULIN PLAS-MCNC: 2.3 G/DL (ref 2–3.5)
GLUCOSE BLD-MCNC: 141 MG/DL (ref 70–99)
HCT VFR BLD AUTO: 45.8 % (ref 35–48)
HGB BLD-MCNC: 15.2 G/DL (ref 12–16)
IMM GRANULOCYTES # BLD AUTO: 0.03 X10(3) UL (ref 0–1)
IMM GRANULOCYTES NFR BLD: 0.6 %
LYMPHOCYTES # BLD AUTO: 1.49 X10(3) UL (ref 1–4)
LYMPHOCYTES NFR BLD AUTO: 27.3 %
MCH RBC QN AUTO: 29.9 PG (ref 26–34)
MCHC RBC AUTO-ENTMCNC: 33.2 G/DL (ref 31–37)
MCV RBC AUTO: 90 FL (ref 80–100)
MONOCYTES # BLD AUTO: 0.49 X10(3) UL (ref 0.1–1)
MONOCYTES NFR BLD AUTO: 9 %
NEUTROPHILS # BLD AUTO: 3.2 X10 (3) UL (ref 1.5–7.7)
NEUTROPHILS # BLD AUTO: 3.2 X10(3) UL (ref 1.5–7.7)
NEUTROPHILS NFR BLD AUTO: 58.6 %
OSMOLALITY SERPL CALC.SUM OF ELEC: 294 MOSM/KG (ref 275–295)
PLATELET # BLD AUTO: 349 10(3)UL (ref 150–450)
POTASSIUM SERPL-SCNC: 3.8 MMOL/L (ref 3.5–5.1)
PROT SERPL-MCNC: 7 G/DL (ref 5.7–8.2)
RBC # BLD AUTO: 5.09 X10(6)UL (ref 3.8–5.3)
SODIUM SERPL-SCNC: 141 MMOL/L (ref 136–145)
TROPONIN I SERPL HS-MCNC: 7 NG/L (ref ?–34)
WBC # BLD AUTO: 5.5 X10(3) UL (ref 4–11)

## 2025-04-30 PROCEDURE — 85025 COMPLETE CBC W/AUTO DIFF WBC: CPT

## 2025-04-30 PROCEDURE — 99284 EMERGENCY DEPT VISIT MOD MDM: CPT

## 2025-04-30 PROCEDURE — 84484 ASSAY OF TROPONIN QUANT: CPT | Performed by: EMERGENCY MEDICINE

## 2025-04-30 PROCEDURE — 93010 ELECTROCARDIOGRAM REPORT: CPT

## 2025-04-30 PROCEDURE — 99283 EMERGENCY DEPT VISIT LOW MDM: CPT

## 2025-04-30 PROCEDURE — 36415 COLL VENOUS BLD VENIPUNCTURE: CPT

## 2025-04-30 PROCEDURE — 80053 COMPREHEN METABOLIC PANEL: CPT | Performed by: EMERGENCY MEDICINE

## 2025-04-30 PROCEDURE — 93798 PHYS/QHP OP CAR RHAB W/ECG: CPT

## 2025-04-30 PROCEDURE — 85025 COMPLETE CBC W/AUTO DIFF WBC: CPT | Performed by: EMERGENCY MEDICINE

## 2025-04-30 PROCEDURE — 93005 ELECTROCARDIOGRAM TRACING: CPT

## 2025-04-30 PROCEDURE — 80053 COMPREHEN METABOLIC PANEL: CPT

## 2025-04-30 PROCEDURE — 84484 ASSAY OF TROPONIN QUANT: CPT

## 2025-04-30 RX ORDER — METOPROLOL SUCCINATE 50 MG/1
50 TABLET, EXTENDED RELEASE ORAL DAILY
COMMUNITY

## 2025-04-30 NOTE — ED INITIAL ASSESSMENT (HPI)
PT states that she noticed very elevated blood pressure reading for several days, very elevated today. PT states no chest pain, no dizziness, intermittent headaches.

## 2025-05-01 LAB
ATRIAL RATE: 66 BPM
P AXIS: 48 DEGREES
P-R INTERVAL: 168 MS
Q-T INTERVAL: 380 MS
QRS DURATION: 78 MS
QTC CALCULATION (BEZET): 398 MS
R AXIS: 47 DEGREES
T AXIS: 72 DEGREES
VENTRICULAR RATE: 66 BPM

## 2025-05-01 NOTE — DISCHARGE INSTRUCTIONS
Follow up at [facility] tomorrow as scheduled. The plan it to also review your blood pressure in addition to your stress test.

## 2025-05-01 NOTE — ED PROVIDER NOTES
Patient Seen in: [facility] Emergency Department      History     Chief Complaint   Patient presents with    Hypertension     Stated Complaint: /109 at home    Subjective:   HPI    79-year-old female presents reporting uncontrolled blood pressure at home.  She reports blood pressure of 217/109 at home.  On arrival here blood pressure was 214/84.  On my evaluation patient's blood pressure 155/71.  She did take a dose of her old hydralazine at home earlier today.  She states she recently had her blood pressure changed and her amlodipine was discontinued and she was started on metoprolol 50 mg daily.  She states that she is scheduled for a nuclear stress test tomorrow and is worried it might not get done because her blood pressure is poorly controlled.  She denies any headache, shortness of breath, chest pain at this time.  History of Present Illness               Objective:     Past Medical History:    Abdominal hernia    Abdominal pain    Age-related osteoporosis without fracture    From 2018: DEXA showed an L1-L4 T-score of -4.2 and a femoral neck T-score of -3.2 (0.498 gm/cm2) and total hip T-score of -3.1     Anxiety    Back problem    Black stools    Blurred vision    Constipation    Fracture of olecranon process of right ulna, closed, initial encounter    Herpes zoster without mention of complication    High blood pressure    High cholesterol    Leaking of urine    Occlusion and stenosis of carotid artery without mention of cerebral infarction    Pain with bowel movements    PONV (postoperative nausea and vomiting)    Prolapse, post-hysterectomy    Shortness of breath    Wears glasses              Past Surgical History:   Procedure Laterality Date          Cyst aspiration left Left ?    benign cysts     Cyst aspiration right Right ?    benign cysts.    Hysterectomy  mid     Oophorectomy Bilateral mid     Other      dermal autograft arms    Tonsillectomy                   Social History     Socioeconomic History    Marital status:    Tobacco Use    Smoking status: Former     Types: Cigarettes    Smokeless tobacco: Never    Tobacco comments:     Quit 25 yrs ago   Vaping Use    Vaping status: Never Used   Substance and Sexual Activity    Alcohol use: Yes     Alcohol/week: 8.3 standard drinks of alcohol     Types: 10 Glasses of wine per week     Comment: social    Drug use: No   Other Topics Concern    Caffeine Concern Yes    Exercise Yes                                Physical Exam     ED Triage Vitals [04/30/25 1723]   BP (!) 214/84   Pulse 69   Resp 16   Temp 97.9 °F (36.6 °C)   Temp src Oral   SpO2 92 %   O2 Device None (Room air)       Current Vitals:   Vital Signs  BP: 155/71  Pulse: (!) 44  Resp: 15  Temp: 97.9 °F (36.6 °C)  Temp src: Oral  MAP (mmHg): 95    Oxygen Therapy  SpO2: 97 %  O2 Device: None (Room air)        Physical Exam  General:  Vitals as listed.  No acute distress   Lungs: good air exchange and clear   Heart: regular rate rhythm and no murmur   Abdomen: Soft and nontender.  No abdominal masses.  No peritoneal signs   Extremities: no edema, normal peripheral pulses   Neuro: Alert oriented and nonfocal   Skin: no rashes or nodules  Physical Exam                ED Course     Labs Reviewed   COMP METABOLIC PANEL (14) - Abnormal; Notable for the following components:       Result Value    Glucose 141 (*)     AST 34 (*)     All other components within normal limits   TROPONIN I HIGH SENSITIVITY - Normal   CBC WITH DIFFERENTIAL WITH PLATELET   RAINBOW DRAW LAVENDER   RAINBOW DRAW LIGHT GREEN   RAINBOW DRAW BLUE     EKG    Rate, intervals and axes as noted on EKG Report.  Rate: 66  Rhythm: Sinus Rhythm  Reading: No acute ischemia              Results                             MDM      79-year-old female with history of hypertension presents reporting uncontrolled blood pressure at home.  She has had some recent changes to her medications.  She is  asymptomatic.  During examination heart rate in the 40s and 50s    Differential includes but is not limited to uncontrolled hypertension, bradycardia, a life/function threat.    CBC, CMP, troponin, EKG ordered for further evaluation.    EKG shows no acute ischemia.  No evidence of endorgan damage.  Cardiac enzymes are within normal limits.  Discussed with cardiology and plan will be for patient to go in for her nuclear stress test tomorrow as scheduled and they will also sit with her and review her medications.  She is comfortable with this plan.  Okay for discharge home and should return with any concerns.            Medical Decision Making      Disposition and Plan     Clinical Impression:  1. Uncontrolled hypertension    2. Bradycardia         Disposition:  Discharge  4/30/2025  9:14 pm    Follow-up:  Vincent Davis MD  [address] [id number] [phone]    Follow up            Medications Prescribed:  Discharge Medication List as of 4/30/2025  9:17 PM          Supplementary Documentation:

## 2025-05-01 NOTE — ED QUICK NOTES
Rounding Completed    Plan of Care reviewed. Waiting for results.  Elimination needs assessed.  Provided updates, pt currently feeling better and has no complaints.    Bed is locked and in lowest position. Call light within reach.

## 2025-05-02 ENCOUNTER — CARDPULM VISIT (OUTPATIENT)
Age: 80
End: 2025-05-02
Attending: INTERNAL MEDICINE
Payer: MEDICARE

## 2025-05-02 DIAGNOSIS — I10 ESSENTIAL HYPERTENSION: ICD-10-CM

## 2025-05-02 PROCEDURE — 93798 PHYS/QHP OP CAR RHAB W/ECG: CPT

## 2025-05-05 ENCOUNTER — CARDPULM VISIT (OUTPATIENT)
Age: 80
End: 2025-05-05
Attending: INTERNAL MEDICINE
Payer: MEDICARE

## 2025-05-05 PROCEDURE — 93798 PHYS/QHP OP CAR RHAB W/ECG: CPT

## 2025-05-07 ENCOUNTER — CARDPULM VISIT (OUTPATIENT)
Age: 80
End: 2025-05-07
Attending: INTERNAL MEDICINE
Payer: MEDICARE

## 2025-05-07 PROCEDURE — 93798 PHYS/QHP OP CAR RHAB W/ECG: CPT

## 2025-05-09 ENCOUNTER — CARDPULM VISIT (OUTPATIENT)
Age: 80
End: 2025-05-09
Attending: INTERNAL MEDICINE
Payer: MEDICARE

## 2025-05-09 PROCEDURE — 93798 PHYS/QHP OP CAR RHAB W/ECG: CPT

## 2025-05-12 ENCOUNTER — CARDPULM VISIT (OUTPATIENT)
Age: 80
End: 2025-05-12
Attending: INTERNAL MEDICINE
Payer: MEDICARE

## 2025-05-12 PROCEDURE — 93798 PHYS/QHP OP CAR RHAB W/ECG: CPT

## 2025-05-14 ENCOUNTER — CARDPULM VISIT (OUTPATIENT)
Age: 80
End: 2025-05-14
Attending: INTERNAL MEDICINE
Payer: MEDICARE

## 2025-05-14 PROCEDURE — 93798 PHYS/QHP OP CAR RHAB W/ECG: CPT

## 2025-05-16 ENCOUNTER — CARDPULM VISIT (OUTPATIENT)
Age: 80
End: 2025-05-16
Attending: INTERNAL MEDICINE
Payer: MEDICARE

## 2025-05-16 PROCEDURE — 93798 PHYS/QHP OP CAR RHAB W/ECG: CPT

## 2025-05-19 ENCOUNTER — CARDPULM VISIT (OUTPATIENT)
Age: 80
End: 2025-05-19
Attending: INTERNAL MEDICINE
Payer: MEDICARE

## 2025-05-19 PROCEDURE — 93798 PHYS/QHP OP CAR RHAB W/ECG: CPT

## 2025-05-21 ENCOUNTER — CARDPULM VISIT (OUTPATIENT)
Age: 80
End: 2025-05-21
Attending: INTERNAL MEDICINE
Payer: MEDICARE

## 2025-05-21 PROCEDURE — 93798 PHYS/QHP OP CAR RHAB W/ECG: CPT

## (undated) DIAGNOSIS — S93.601A SPRAIN OF RIGHT FOOT, INITIAL ENCOUNTER: ICD-10-CM

## (undated) DIAGNOSIS — M19.071 ARTHRITIS OF RIGHT FOOT: ICD-10-CM

## (undated) DIAGNOSIS — M79.671 RIGHT FOOT PAIN: Primary | ICD-10-CM

## (undated) DIAGNOSIS — M00.871: ICD-10-CM

## (undated) NOTE — LETTER
02/13/19        Dottie Read  [address] [facility]      Dear Jaylene Horowitz,    [address] records indicate that you have outstanding lab work and or testing that was ordered for you and has not yet been completed:  Orders Placed This Encounter      CBC

## (undated) NOTE — LETTER
January 21, 2019    [facility]  [address] [address]      Dear Nick Atwood: The following are the results of your recent tests. Please review the list of test results.   Your result is the value on the left; we have also supplied the range

## (undated) NOTE — LETTER
03/18/22        Nicole Menjivar  [address] [place] [phone]      Dear Nay Him records indicate that you have outstanding lab work and or testing that was ordered for you and has not yet been completed:  Orders Placed This Encounter      Free T4, (Free Thyroxine)      Assay, Thyroid Stim Hormone      Lipid Panel      CBC With Differential With Platelet      Vitamin B12      Comp Metabolic Panel (14)      Vitamin D [E]      Magnesium [E]      FABIANO GOPAL 2D+3D DIAGNOSTIC FABIANO  BILAT (CPT=77066/85865)      XR DEXA BONE DENSITOMETRY (CPT=77080)      XR FOOT, COMPLETE (MIN 3 VIEWS), RIGHT (CPT=73630)    To provide you with the best possible care, please complete these orders at your earliest convenience. If you have recently completed these orders please disregard this letter. If you have any questions please call the office at Dept: [phone].      Thank you,       Luiz Smith, DO

## (undated) NOTE — LETTER
03/10/21        Axel Salter  [address] [place] [id number]      Dear Tiarar Dixon records indicate that you have outstanding lab work and or testing that was ordered for you and has not yet been completed:  Orders Placed This Encounter      T